# Patient Record
Sex: FEMALE | Race: WHITE | NOT HISPANIC OR LATINO | Employment: FULL TIME | URBAN - METROPOLITAN AREA
[De-identification: names, ages, dates, MRNs, and addresses within clinical notes are randomized per-mention and may not be internally consistent; named-entity substitution may affect disease eponyms.]

---

## 2017-03-09 ENCOUNTER — LAB VISIT (OUTPATIENT)
Dept: LAB | Facility: HOSPITAL | Age: 42
End: 2017-03-09
Attending: INTERNAL MEDICINE
Payer: COMMERCIAL

## 2017-03-09 ENCOUNTER — OFFICE VISIT (OUTPATIENT)
Dept: INTERNAL MEDICINE | Facility: CLINIC | Age: 42
End: 2017-03-09
Payer: COMMERCIAL

## 2017-03-09 VITALS
WEIGHT: 143.94 LBS | SYSTOLIC BLOOD PRESSURE: 128 MMHG | HEIGHT: 64 IN | HEART RATE: 81 BPM | BODY MASS INDEX: 24.57 KG/M2 | DIASTOLIC BLOOD PRESSURE: 82 MMHG

## 2017-03-09 DIAGNOSIS — S63.641S: ICD-10-CM

## 2017-03-09 DIAGNOSIS — R35.0 URINARY FREQUENCY: ICD-10-CM

## 2017-03-09 DIAGNOSIS — Z00.00 HEALTH CARE MAINTENANCE: Primary | ICD-10-CM

## 2017-03-09 DIAGNOSIS — R03.0 BORDERLINE HIGH BLOOD PRESSURE: ICD-10-CM

## 2017-03-09 DIAGNOSIS — E78.9 BORDERLINE HIGH CHOLESTEROL: ICD-10-CM

## 2017-03-09 DIAGNOSIS — Z23 NEED FOR TDAP VACCINATION: ICD-10-CM

## 2017-03-09 DIAGNOSIS — Z00.00 HEALTH CARE MAINTENANCE: ICD-10-CM

## 2017-03-09 LAB
ALBUMIN SERPL BCP-MCNC: 4.1 G/DL
ALP SERPL-CCNC: 75 U/L
ALT SERPL W/O P-5'-P-CCNC: 18 U/L
ANION GAP SERPL CALC-SCNC: 11 MMOL/L
AST SERPL-CCNC: 15 U/L
BASOPHILS # BLD AUTO: 0.03 K/UL
BASOPHILS NFR BLD: 0.4 %
BILIRUB SERPL-MCNC: 0.5 MG/DL
BILIRUB UR QL STRIP: NEGATIVE
BUN SERPL-MCNC: 11 MG/DL
CALCIUM SERPL-MCNC: 9.2 MG/DL
CHLORIDE SERPL-SCNC: 106 MMOL/L
CHOLEST/HDLC SERPL: 5 {RATIO}
CLARITY UR REFRACT.AUTO: CLEAR
CO2 SERPL-SCNC: 22 MMOL/L
COLOR UR AUTO: YELLOW
CREAT SERPL-MCNC: 0.8 MG/DL
DIFFERENTIAL METHOD: NORMAL
EOSINOPHIL # BLD AUTO: 0.2 K/UL
EOSINOPHIL NFR BLD: 3.2 %
ERYTHROCYTE [DISTWIDTH] IN BLOOD BY AUTOMATED COUNT: 12.9 %
EST. GFR  (AFRICAN AMERICAN): >60 ML/MIN/1.73 M^2
EST. GFR  (NON AFRICAN AMERICAN): >60 ML/MIN/1.73 M^2
GLUCOSE SERPL-MCNC: 118 MG/DL
GLUCOSE UR QL STRIP: NEGATIVE
HCT VFR BLD AUTO: 39.5 %
HDL/CHOLESTEROL RATIO: 20.2 %
HDLC SERPL-MCNC: 258 MG/DL
HDLC SERPL-MCNC: 52 MG/DL
HGB BLD-MCNC: 13.3 G/DL
HGB UR QL STRIP: NEGATIVE
KETONES UR QL STRIP: NEGATIVE
LDLC SERPL CALC-MCNC: 191.2 MG/DL
LEUKOCYTE ESTERASE UR QL STRIP: NEGATIVE
LYMPHOCYTES # BLD AUTO: 2.8 K/UL
LYMPHOCYTES NFR BLD: 38.4 %
MCH RBC QN AUTO: 28.9 PG
MCHC RBC AUTO-ENTMCNC: 33.7 %
MCV RBC AUTO: 86 FL
MONOCYTES # BLD AUTO: 0.5 K/UL
MONOCYTES NFR BLD: 6.5 %
NEUTROPHILS # BLD AUTO: 3.7 K/UL
NEUTROPHILS NFR BLD: 51.4 %
NITRITE UR QL STRIP: NEGATIVE
NONHDLC SERPL-MCNC: 206 MG/DL
PH UR STRIP: 6 [PH] (ref 5–8)
PLATELET # BLD AUTO: 316 K/UL
PMV BLD AUTO: 9.3 FL
POTASSIUM SERPL-SCNC: 4.4 MMOL/L
PROT SERPL-MCNC: 7.5 G/DL
PROT UR QL STRIP: NEGATIVE
RBC # BLD AUTO: 4.61 M/UL
SODIUM SERPL-SCNC: 139 MMOL/L
SP GR UR STRIP: 1.02 (ref 1–1.03)
TRIGL SERPL-MCNC: 74 MG/DL
TSH SERPL DL<=0.005 MIU/L-ACNC: 1.75 UIU/ML
URN SPEC COLLECT METH UR: NORMAL
UROBILINOGEN UR STRIP-ACNC: NEGATIVE EU/DL
WBC # BLD AUTO: 7.26 K/UL

## 2017-03-09 PROCEDURE — 85025 COMPLETE CBC W/AUTO DIFF WBC: CPT

## 2017-03-09 PROCEDURE — 99396 PREV VISIT EST AGE 40-64: CPT | Mod: 25,S$GLB,, | Performed by: INTERNAL MEDICINE

## 2017-03-09 PROCEDURE — 84443 ASSAY THYROID STIM HORMONE: CPT

## 2017-03-09 PROCEDURE — 36415 COLL VENOUS BLD VENIPUNCTURE: CPT

## 2017-03-09 PROCEDURE — 99999 PR PBB SHADOW E&M-EST. PATIENT-LVL IV: CPT | Mod: PBBFAC,,, | Performed by: INTERNAL MEDICINE

## 2017-03-09 PROCEDURE — 87088 URINE BACTERIA CULTURE: CPT

## 2017-03-09 PROCEDURE — 87086 URINE CULTURE/COLONY COUNT: CPT

## 2017-03-09 PROCEDURE — 80053 COMPREHEN METABOLIC PANEL: CPT

## 2017-03-09 PROCEDURE — 90715 TDAP VACCINE 7 YRS/> IM: CPT | Mod: S$GLB,,, | Performed by: INTERNAL MEDICINE

## 2017-03-09 PROCEDURE — 90471 IMMUNIZATION ADMIN: CPT | Mod: S$GLB,,, | Performed by: INTERNAL MEDICINE

## 2017-03-09 PROCEDURE — 80061 LIPID PANEL: CPT

## 2017-03-09 PROCEDURE — 81003 URINALYSIS AUTO W/O SCOPE: CPT

## 2017-03-09 PROCEDURE — 83036 HEMOGLOBIN GLYCOSYLATED A1C: CPT

## 2017-03-09 NOTE — PROGRESS NOTES
"Subjective:       Patient ID: Stacia Clark is a 41 y.o. female.    Chief Complaint: Annual Exam    HPI   42 yo F here for annual wellness exam.    She has hx of back and neck issues w pinched nerves and djd. She had surgery at age 19yo.  She had TLH for pelvic pain in 2016. Path benign.     Urinating more often since surgery, feels like she has incomplete emptying.     Still with umbilical hernia. Working on losing weight.     She went to urgent care a few years ago regarding "skier's thumb."  Wraps boxes a lot at work and repeated use aggravates this. Ibuprofen few times a week.   Wears brace at night and during day when bad.   Review of Systems   Constitutional: Negative for fever.   HENT: Negative.    Eyes: Negative.    Respiratory: Negative for shortness of breath.    Cardiovascular: Negative for chest pain and leg swelling.   Gastrointestinal: Negative for abdominal pain, diarrhea, nausea and vomiting.   Genitourinary: Positive for frequency.        Feels like she does not completely empty her bladder   Musculoskeletal: Negative for arthralgias.   Skin: Negative for rash.   Psychiatric/Behavioral: Negative.        Objective:   /82  Pulse 81  Ht 5' 4" (1.626 m)  Wt 65.3 kg (143 lb 15.4 oz)  LMP 12/27/2015  BMI 24.71 kg/m2     Physical Exam   Constitutional: She is oriented to person, place, and time. She appears well-developed and well-nourished.   HENT:   Head: Normocephalic and atraumatic.   Eyes: Conjunctivae and EOM are normal. Pupils are equal, round, and reactive to light.   Neck: Neck supple. No thyromegaly present.   Cardiovascular: Normal rate, regular rhythm and normal heart sounds.    No murmur heard.  Pulmonary/Chest: Effort normal and breath sounds normal. No respiratory distress. She has no wheezes.   Abdominal: Soft. Bowel sounds are normal. She exhibits no distension. There is no tenderness.   Musculoskeletal: Normal range of motion.   Neurological: She is alert and oriented to " person, place, and time.   Skin: Skin is warm and dry. No rash noted.   Psychiatric: She has a normal mood and affect. Judgment and thought content normal.   Vitals reviewed.      Assessment:       1. Health care maintenance    2. Skier's thumb, right, sequela    3. Need for Tdap vaccination    4. Borderline high blood pressure    5. Borderline high cholesterol    6. Urinary frequency        Plan:       Stacia was seen today for annual exam.    Diagnoses and all orders for this visit:    Health care maintenance  -     CBC auto differential; Future  -     Comprehensive metabolic panel; Future  -     Hemoglobin A1c; Future  -     Lipid panel; Future  -     TSH; Future    Skier's thumb, right, sequela  -     Ambulatory referral to Orthopedics   Continue brace   nsaids prn    Need for Tdap vaccination  -     Tdap Vaccine    Borderline high blood pressure   Discussed low salt diet    Borderline high cholesterol   Discussed low cholesterol diet - already following pretty low cholesterol diet    Urinary frequency and sensation of incomplete bladder emptying since hysterectomy  -     Urinalysis  -     Urine culture   If symptoms persist advised her to discuss w Dr. Tovar; consider urology evaluation

## 2017-03-09 NOTE — MR AVS SNAPSHOT
"    [facility]  [address]  [facility] [address]  Phone: [phone]  Fax: [phone]                  Stacia Clark   3/9/2017 8:00 AM   Office Visit    Description:  Female : 1975   Provider:  Bianca Gibson MD   Department:  [facility]           Reason for Visit     Annual Exam           Diagnoses this Visit        Comments    Health care maintenance    -  Primary     Skier's thumb, right, sequela         Need for Tdap vaccination         Borderline high blood pressure         Borderline high cholesterol         Urinary frequency                To Do List           Goals (5 Years of Data)     None      Follow-Up and Disposition     Return in about 1 year (around 3/9/2018) for 40 minute established physical.      [facility] On Call     [facility] On Call Nurse Care Line -  Assistance  Registered nurses in the [facility] On Call Center provide clinical advisement, health education, appointment booking, and other advisory services.  Call for this free service at [phone].             Medications           Message regarding Medications     Verify the changes and/or additions to your medication regime listed below are the same as discussed with your clinician today.  If any of these changes or additions are incorrect, please notify your healthcare provider.             Verify that the below list of medications is an accurate representation of the medications you are currently taking.  If none reported, the list may be blank. If incorrect, please contact your healthcare provider. Carry this list with you in case of emergency.                Clinical Reference Information           Your Vitals Were     BP Pulse Height Weight Last Period BMI    128/82 81 5' 4" (1.626 m) 65.3 kg (143 lb 15.4 oz) 2015 24.71 kg/m2      Blood Pressure          Most Recent Value    BP  128/82      Allergies as of 3/9/2017     No Known Allergies      Immunizations " Administered on Date of Encounter - 3/9/2017     Name Date Dose VIS Date Route    TDAP  Incomplete 0.5 mL 2/24/2015 Intramuscular      Orders Placed During Today's Visit      Normal Orders This Visit    Ambulatory referral to Orthopedics     Tdap Vaccine     Urinalysis     Urine culture     Future Labs/Procedures Expected by Expires    CBC auto differential  3/9/2017 5/8/2018    Comprehensive metabolic panel  3/9/2017 5/8/2018    Hemoglobin A1c  3/9/2017 5/8/2018    Lipid panel  3/9/2017 5/8/2018    TSH  3/9/2017 5/8/2018      Instructions      Controlling High Blood Pressure  High blood pressure (hypertension) is often called the silent killer. This is because many people who have it dont know it. High blood pressure is defined as 140/90 mm Hg or higher. Know your blood pressure and remember to check it regularly. Doing so can save your life. Here are some things you can do to help control your blood pressure.    Choose heart-healthy foods  · Select low-salt, low-fat foods. Limit sodium intake to 2,400 mg per day or the amount suggested by your healthcare provider.  · Limit canned, dried, cured, packaged, and fast foods. These can contain a lot of salt.  · Eat 8 to 10 servings of fruits and vegetables every day.  · Choose lean meats, fish, or chicken.  · Eat whole-grain pasta, brown rice, and beans.  · Eat 2 to 3 servings of low-fat or fat-free dairy products.  · Ask your doctor about the DASH eating plan. This plan helps reduce blood pressure.  · When you go to a restaurant, ask that your meal be prepared with no added salt.  Maintain a healthy weight  · Ask your healthcare provider how many calories to eat a day. Then stick to that number.  · Ask your healthcare provider what weight range is healthiest for you. If you are overweight, a weight loss of only 3% to 5% of your body weight can help lower blood pressure. Generally, a good weight loss goal is to lose 10% of your body weight in a year.  · Limit snacks  and sweets.  · Get regular exercise.  Get up and get active  · Choose activities you enjoy. Find ones you can do with friends or family. This includes bicycling, dancing, walking, and jogging.  · Park farther away from building entrances.  · Use stairs instead of the elevator.  · When you can, walk or bike instead of driving.  · [place] leaves, garden, or do household repairs.  · Be active at a moderate to vigorous level of physical activity for at least 40 minutes for a minimum of 3 to 4 days a week.   Manage stress  · Make time to relax and enjoy life. Find time to laugh.  · Communicate your concerns with your loved ones and your healthcare provider.  · Visit with family and friends, and keep up with hobbies.  Limit alcohol and quit smoking  · Men should have no more than 2 drinks per day.  · Women should have no more than 1 drink per day.  · Talk with your healthcare provider about quitting smoking. Smoking significantly increases your risk for heart disease and stroke. Ask your healthcare provider about community smoking cessation programs and other options.  Medicines  If lifestyle changes arent enough, your healthcare provider may prescribe high blood pressure medicine. Take all medicines as prescribed. If you have any questions about your medicines, ask your healthcare provider before stopping or changing them.   Date Last Reviewed: 4/27/2016 © 2000-2016 [employer]. [address] [place] [address]. All rights reserved. This information is not intended as a substitute for professional medical care. Always follow your healthcare professional's instructions.             Language Assistance Services     ATTENTION: Language assistance services are available, free of charge. Please call [phone].      ATENCIÓN: Si habla español, tiene a gan disposición servicios gratuitos de asistencia lingüística. Llame al [phone].     SHYLA Ý: N?u b?n nói Ti?ng Vi?t, có các d?ch v? h? tr? ngôn  ng? mi?n phí dành cho b?n. G?i s? [phone].         [facility] complies with applicable Federal civil rights laws and does not discriminate on the basis of race, color, national origin, age, disability, or sex.

## 2017-03-09 NOTE — PATIENT INSTRUCTIONS
Controlling High Blood Pressure  High blood pressure (hypertension) is often called the silent killer. This is because many people who have it dont know it. High blood pressure is defined as 140/90 mm Hg or higher. Know your blood pressure and remember to check it regularly. Doing so can save your life. Here are some things you can do to help control your blood pressure.    Choose heart-healthy foods  · Select low-salt, low-fat foods. Limit sodium intake to 2,400 mg per day or the amount suggested by your healthcare provider.  · Limit canned, dried, cured, packaged, and fast foods. These can contain a lot of salt.  · Eat 8 to 10 servings of fruits and vegetables every day.  · Choose lean meats, fish, or chicken.  · Eat whole-grain pasta, brown rice, and beans.  · Eat 2 to 3 servings of low-fat or fat-free dairy products.  · Ask your doctor about the DASH eating plan. This plan helps reduce blood pressure.  · When you go to a restaurant, ask that your meal be prepared with no added salt.  Maintain a healthy weight  · Ask your healthcare provider how many calories to eat a day. Then stick to that number.  · Ask your healthcare provider what weight range is healthiest for you. If you are overweight, a weight loss of only 3% to 5% of your body weight can help lower blood pressure. Generally, a good weight loss goal is to lose 10% of your body weight in a year.  · Limit snacks and sweets.  · Get regular exercise.  Get up and get active  · Choose activities you enjoy. Find ones you can do with friends or family. This includes bicycling, dancing, walking, and jogging.  · Park farther away from building entrances.  · Use stairs instead of the elevator.  · When you can, walk or bike instead of driving.  · [place] leaves, garden, or do household repairs.  · Be active at a moderate to vigorous level of physical activity for at least 40 minutes for a minimum of 3 to 4 days a week.   Manage stress  · Make time to relax and enjoy  life. Find time to laugh.  · Communicate your concerns with your loved ones and your healthcare provider.  · Visit with family and friends, and keep up with hobbies.  Limit alcohol and quit smoking  · Men should have no more than 2 drinks per day.  · Women should have no more than 1 drink per day.  · Talk with your healthcare provider about quitting smoking. Smoking significantly increases your risk for heart disease and stroke. Ask your healthcare provider about community smoking cessation programs and other options.  Medicines  If lifestyle changes arent enough, your healthcare provider may prescribe high blood pressure medicine. Take all medicines as prescribed. If you have any questions about your medicines, ask your healthcare provider before stopping or changing them.   Date Last Reviewed: 4/27/2016  © 9022-4296 [employer]. [address] [place] [address], [place] [address]. All rights reserved. This information is not intended as a substitute for professional medical care. Always follow your healthcare professional's instructions.

## 2017-03-10 LAB
ESTIMATED AVG GLUCOSE: 131 MG/DL
HBA1C MFR BLD HPLC: 6.2 %

## 2017-03-11 LAB — BACTERIA UR CULT: NORMAL

## 2017-03-17 ENCOUNTER — PATIENT MESSAGE (OUTPATIENT)
Dept: INTERNAL MEDICINE | Facility: CLINIC | Age: 42
End: 2017-03-17

## 2017-03-20 ENCOUNTER — TELEPHONE (OUTPATIENT)
Dept: ORTHOPEDICS | Facility: CLINIC | Age: 42
End: 2017-03-20

## 2017-03-20 DIAGNOSIS — R52 PAIN: Primary | ICD-10-CM

## 2017-03-20 NOTE — TELEPHONE ENCOUNTER
Stacia Clark reminded of appointment on 3/21/17 with Dr. DIANNA Kim w/time and location. Notified of need for xray before OV w/date, time, and location of appts.

## 2017-03-21 ENCOUNTER — OFFICE VISIT (OUTPATIENT)
Dept: ORTHOPEDICS | Facility: CLINIC | Age: 42
End: 2017-03-21
Payer: COMMERCIAL

## 2017-03-21 ENCOUNTER — HOSPITAL ENCOUNTER (OUTPATIENT)
Dept: RADIOLOGY | Facility: OTHER | Age: 42
Discharge: HOME | End: 2017-03-21
Attending: ORTHOPAEDIC SURGERY
Payer: COMMERCIAL

## 2017-03-21 VITALS
HEIGHT: 64 IN | SYSTOLIC BLOOD PRESSURE: 152 MMHG | DIASTOLIC BLOOD PRESSURE: 92 MMHG | HEART RATE: 87 BPM | WEIGHT: 143.94 LBS | BODY MASS INDEX: 24.57 KG/M2

## 2017-03-21 DIAGNOSIS — R52 PAIN: ICD-10-CM

## 2017-03-21 DIAGNOSIS — M79.644 THUMB PAIN, RIGHT: Primary | ICD-10-CM

## 2017-03-21 PROCEDURE — 1160F RVW MEDS BY RX/DR IN RCRD: CPT | Mod: S$GLB,,, | Performed by: ORTHOPAEDIC SURGERY

## 2017-03-21 PROCEDURE — 73130 X-RAY EXAM OF HAND: CPT | Mod: 26,RT,, | Performed by: RADIOLOGY

## 2017-03-21 PROCEDURE — 73130 X-RAY EXAM OF HAND: CPT | Mod: TC,RT

## 2017-03-21 PROCEDURE — 99203 OFFICE O/P NEW LOW 30 MIN: CPT | Mod: S$GLB,,, | Performed by: ORTHOPAEDIC SURGERY

## 2017-03-21 PROCEDURE — 99999 PR PBB SHADOW E&M-EST. PATIENT-LVL III: CPT | Mod: PBBFAC,,, | Performed by: ORTHOPAEDIC SURGERY

## 2017-03-21 NOTE — LETTER
March 21, 2017      Bianca Gibson MD  [address]  [facility] [address]           [facility]  [address]  [address]  [facility] [address]  Phone: [phone]          Patient: Stacia Clark   MR Number: [id number]   YOB: 1975   Date of Visit: 3/21/2017       Dear Dr. Bianca Gibson:    Thank you for referring Stacia Clark to me for evaluation. Attached you will find relevant portions of my assessment and plan of care.    If you have questions, please do not hesitate to call me. I look forward to following Stacia Clark along with you.    Sincerely,    Tamika Mena PA-C    Enclosure  CC:  No Recipients    If you would like to receive this communication electronically, please contact [email or web address] or [phone] to request more information on [employer] Link access.    For providers and/or their staff who would like to refer a patient to [facility], please contact us through our one-stop-shop provider referral line, [facility] [place], at [phone].    If you feel you have received this communication in error or would no longer like to receive these types of communications, please e-mail [email or web address]

## 2017-03-21 NOTE — PROGRESS NOTES
This office note has been dictated.   Dictation Confirmation Code: [id number]  Radha Mena PA-C  03/21/2017  9:13 AM  Supervising Physician:  MD Stacia Billy was seen today for pain and numbness.    Diagnoses and all orders for this visit:    Thumb pain, right  -     Ambulatory Referral to Physical/Occupational Therapy

## 2017-03-21 NOTE — MR AVS SNAPSHOT
"    [facility]  [address]  [address]  [facility] [address]  Phone: [phone]                  Stacia Clark   3/21/2017 9:30 AM   Office Visit    Description:  Female : 1975   Provider:  Sienna Kim MD   Department:  [facility]           Reason for Visit     Right Hand - Pain, Numbness           Diagnoses this Visit        Comments    Thumb pain, right    -  Primary            To Do List           Future Appointments        Provider Department Dept Phone    2017 9:00 AM Sienna Kim MD [facility] [phone]      Goals (5 Years of Data)     None      [facility] On [facility]     [facility] On [facility] Nurse Care Line -  Assistance  Registered nurses in the [facility] On Call Center provide clinical advisement, health education, appointment booking, and other advisory services.  Call for this free service at [phone].             Medications           Message regarding Medications     Verify the changes and/or additions to your medication regime listed below are the same as discussed with your clinician today.  If any of these changes or additions are incorrect, please notify your healthcare provider.             Verify that the below list of medications is an accurate representation of the medications you are currently taking.  If none reported, the list may be blank. If incorrect, please contact your healthcare provider. Carry this list with you in case of emergency.                Clinical Reference Information           Your Vitals Were     BP Pulse Height Weight Last Period BMI    152/92 (BP Location: Left arm) 87 5' 4" (1.626 m) 65.3 kg (143 lb 15.4 oz) 2015 24.71 kg/m2      Blood Pressure          Most Recent Value    BP  (!)  152/92      Allergies as of 3/21/2017     No Known Allergies      Immunizations Administered on Date of Encounter - 3/21/2017     None      Orders Placed During Today's Visit      Normal Orders This Visit "    Ambulatory Referral to Physical/Occupational Therapy       Language Assistance Services     ATTENTION: Language assistance services are available, free of charge. Please call [phone].      ATENCIÓN: Si habla darwin, tiene a gan disposición servicios gratuitos de asistencia lingüística. Llame al [phone].     CHÚ Ý: N?u b?n nói Ti?ng Vi?t, có các d?ch v? h? tr? ngôn ng? mi?n phí dành cho b?n. G?i s? [phone].         [facility] complies with applicable Federal civil rights laws and does not discriminate on the basis of race, color, national origin, age, disability, or sex.

## 2017-03-21 NOTE — PROGRESS NOTES
I have personally taken the history and examined the patient. I agree with the Hand Surgery PA's note. The plan will be OT. Pt had repetitive injury ( wrapping favor boxes). Pt did go to urgent care. 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  Pt has used brace.

## 2017-03-22 NOTE — PROGRESS NOTES
CHIEF COMPLAINT:  Right thumb pain.    HISTORY OF PRESENT ILLNESS:  Ms. Clark is a 41-year-old right-hand dominant   female presenting today for evaluation of her right thumb that has been painful   for approximately two years.  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  She reports this has been   going on for two years and is still not getting better.  It occasionally locks   and is painful.  She feels like she has got to move it back into position.  She   occasionally has numbness.  She feels that it is not going away.  It comes and   goes.  She is wearing a thumb brace, although she feels that it is not very   stable.  She also has pain that radiates from the thumb all the way up the   forearm and as well as on the dorsal aspect of the thumb.  No specific locking   or triggering of the thumb.    History reviewed. No pertinent past medical history.    Past Surgical History:   Procedure Laterality Date    BACK SURGERY      discectomy for herniated disc; age 19yo    INDUCED   ~    LAPAROSCOPIC HYSTERECTOMY  2016    WISDOM TOOTH EXTRACTION         Social History     Social History    Marital status: Single     Spouse name: N/A    Number of children: N/A    Years of education: N/A     Occupational History    Not on file.     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     No current facility-administered medications on file prior to visit.        Review of patient's allergies indicates:  No Known  "Allergies    Review of Systems:  Constitutional: no fever or chills  ENT: no nasal congestion or sore throat  Respiratory: no cough or shortness of breath  Cardiovascular: no chest pain or palpitations  Gastrointestinal: no nausea or vomiting  Genitourinary: no hematuria or dysuria  Integument/Breast: no rash or pruritis  Hematologic/Lymphatic: no easy bruising or lymphadenopathy  Musculoskeletal: see HPI  Neurological: no seizures or tremors  Behavioral/Psych: no auditory or visual hallucinations      PHYSICAL EXAM    Vitals:    03/21/17 0858   BP: (!) 152/92   Pulse: 87   Weight: 65.3 kg (143 lb 15.4 oz)   Height: 5' 4" (1.626 m)   PainSc:   5   PainLoc: Hand       GENERAL:  Well developed, well nourished, no acute distress.  CARDIOVASCULAR:  Regular rate and rhythm.  LUNGS:  Respirations equal and unlabored.  BEHAVIORAL AND PSYCH:  Mood and affect appropriate.  NEUROLOGIC:  No tremor.  HEENT:  Normocephalic, atraumatic.  MUSCULOSKELETAL:  Upper extremity exam:  She is distally neurovascularly intact.    AIN, PIN nerves are intact.  Sensation over the radial, ulnar and median   nerves are equivocal.  No tenderness over the ulnar collateral or radial   collateral ligament.  No laxity appreciated of either of the collaterals.  She   does not have any disruption in the central slip.  She has minimal tenderness at   the level of the A1 pulley.  No catch and snap appreciated.  Mild tenderness in   the thenar, mild tenderness along the carpal tunnel, but negative Tinel's.    X-RAY IMAGING:  No fractures, dislocations, no subluxation of the MCP.    ASSESSMENT:  Overuse injury of the right thumb.    PLAN:  I discussed with Ms. Clark that at this time, I do not appreciate any   signs or symptoms of de Quervain's or trigger thumb.  She appears to have an   overuse injury of the thumb.  We will try therapy for range of motion   modalities, ultrasounding as needed.  We also discussed that they will make her   an orthoplast " or a custom orthotic to help with stabilization of the thumb that   she can wear more frequently.  She will proceed with this and followup in clinic   in seven weeks per Dr. Kerr.    SUPERVISING PHYSICIAN:  Sienna Kim M.D.    DICTATED BY:  Tamika CHRISTINE  dd: 03/21/2017 10:47:55 (CDT)  td: 03/22/2017 02:27:05 (CDT)  Doc ID   #[id number]  Job ID #[id number]    CC:

## 2017-03-27 ENCOUNTER — CLINICAL SUPPORT (OUTPATIENT)
Dept: REHABILITATION | Facility: HOSPITAL | Age: 42
End: 2017-03-27
Attending: ORTHOPAEDIC SURGERY
Payer: OTHER MISCELLANEOUS

## 2017-03-27 DIAGNOSIS — M25.541 PAIN IN THUMB JOINT WITH MOVEMENT OF RIGHT HAND: ICD-10-CM

## 2017-03-27 PROCEDURE — L3913 HFO W/O JOINTS CF: HCPCS | Mod: PO

## 2017-03-27 PROCEDURE — 97165 OT EVAL LOW COMPLEX 30 MIN: CPT | Mod: PO

## 2017-03-27 PROCEDURE — 97018 PARAFFIN BATH THERAPY: CPT | Mod: PO

## 2017-03-27 NOTE — PATIENT INSTRUCTIONS
IP Flexion (Active Blocked)        Brace thumb below tip joint. Bend joint as far as possible.  Repeat ____ times. Do ____ sessions per day.      Easy Thumb Bend        Keeping fingers relaxed, bend thumb joints then straighten.  Repeat ____ times. Do ____ sessions per day.      IP Extension (Active Blocked)        Brace thumb below tip joint. Extend joint as far as possible.  Repeat ____ times. Do ____ sessions per day.      AROM: Wrist Extension        With right palm down, bend wrist up.  Repeat ____ times per set. Do ____ sets per session. Do ____ sessions per day.       AROM: Wrist Flexion        With right palm up, bend wrist up.  Repeat ____ times per set. Do ____ sets per session. Do ____ sessions per day.       Strengthening (Resistive Putty)        Squeeze putty using thumb and all fingers.  Repeat ____ times. Do ____ sessions per day.

## 2017-03-27 NOTE — PROGRESS NOTES
"Occupational Therapy -Hand / Wrist  Evaluation    Patient: Stacia Clark  Date of Evaluation: 3/27/2017  Referring Physician: Sienna Kim, *  Diagnosis:   1. Pain in thumb joint with movement of right hand       MRN: [id number]  Age: 41 y.o.  Sex: female     Referral Orders:   Eval and treat     Start Time: 3pm  End Time: 4pm  Total Time: 60      Hand dominance: Right      Subjective     Stacia is a 41 y.o. female that presents to clinic secondary to R thumb pain. 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 She went to urgent care and was given a brace to wear.  X-ray and MRI was taken and revealed no fractures nor dislocations. Previous treatment included immobilization and rest. Pt reports that gripping and pinches increases her pain and  reports her pain at worst is a 6 on the VAS. She reports that she has a "locking" feeling throughout the day after overuse that she has to pull out and then away in order to regain use of her thumb again. Pt uses Ibuprofen to control her symptoms. No cultural, environmental, or spiritual barriers identified to treatment or learning.    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    Allergies:  Review of patient's allergies indicates:  No Known Allergies    Barriers:  Environmental Concerns/ Fall Risk:  None  Barriers to Learning: None   Cultural/Spiritual : None   Developmental/Education: None   Abuse/ Neglect: none   Nutritional Deficit: None   Language: None   Hearing/Vision Deficit: None "   Other: NONE    Objective     Observation:   WNL    Sensation:   Minimal paresthesia noted in distal area of R thumb    Special Tests:   Finkelstein's Test  negative  Grind Test  negative   Martínez Scaphoid Shift Test  negative    Range of Motion:     AROM  (Ext/Flex) Thumb Right  Thumb Left   MP 0/50 0/55   PIP 0/60 0/60   AMRLOW 130 135     Right:  Thumb Opposition: 0  Palmar Abduction: 60  Radial Abduction: 55    Manual Muscle Testing: Right    Median Innervated:  FPL 4+/5   APB 4/5   OP 4/5     Ulnar Innervated:  WNL    Radial Nerve Innervated:  EPL 4/5        Strength: (RODNEY Dynamometer in lbs.) Average 3 trials, Position II  Right: 37#  Left: 44#    Pinch Strength: (Pinch Gauge in psi's), Average 3 trials  Cornejo Pinch  R) 7 psi's    L) 10 psi's  3pt Pinch    R) 8 psi's   L) 9 psi's  2pt Pinch    R) 5 psi's    L) 6 psi's       OUTCOME MEASURE: FOTO    Category: Self Care      Current Score  = 51% or 49% impaired  Goal at Discharge Score = 65% or 35% impaired      Treatment today included: Patient received paraffin bath to R hand(s) for 10 minutes to increase blood flow, circulation, pain management and for tissue elasticity prior to participation and demonstration of HEP. Fabricated a hand based thumb spica orthosis to R hand to protect tendon, maintain immobilization of R thumb in order\  to improve functional hand use.  Instructed to wear while working, with removal for HEP, bathing/hygiene.  Instructed to monitor for pain/pressure, increased edema, or redness and to contact office for adjustments as needed. Patient reported good understanding of orthotic wear and care schedule.     Assessment     This is a 41 y.o. female referred to outpatient occupational/hand therapy and presents with a medical diagnosis of R thumb sprain and demonstrates limitations as described in the problem list. Following brief medical record review it is determined that pt will benefit from occupational therapy services in order to  maximize pain free and/or functional use of right hand. The following goals were discussed with the patient and patient is in agreement with them as to be addressed in the treatment plan. Pt was given a HEP consisting of thumb IP blocking, median nerve glides, and putty squeeze. Pt verbally understood the instructions as they were given and demonstrated proper form and technique during therapy. Pt was advise to perform these exercises free of pain, and to stop performing them if pain occurs. The patient's rehab potential is good.     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    Medical and Therapy History Review: Brief                 Performance Deficits: As follows affecting work tasks (assembling boxes, packing candy, making candy, wrapping boxes)    Physical:  Decreased ROM   Decreased  and pinch strength   Decreased muscle strength   Decreased functional hand use   Increased pain       Cognitive:  WNL    Psychosocial:    WNL     Modification of tasks during evaluation: Clinical decision making of low complexity, which includes an analysis of the occupational profile, analysis of date from problem focused assessments, and the consideration of limited treatment options. Patient presents with few comorbidities that affect occupational performance. No modification(s) of tasks or assistance with assessments is necessary to enable completion of evaluation component.   low  Based on PMHX, co morbidities , data from assessments and functional level of assistance required with task and clinical presentation directly impacting           Goals: ( 6 weeks)   1)   Patient to be IND with HEP and modalities for pain management  2)   Increase  strength 45 lbs. to grasp packaging at work.  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  4)   Patient to score at 65% or more on  FOTO to demonstrate improved perception of functional RUE use.        Plan     Pt to be treated by Occupational Therapy 1-2 times per week for 5 weeks during the certification period from 3/27/17 to 5/2/17 to achieve the established goals.     Treatment to include: Paraffin, Fluidotherapy, Manual therapy/joint mobilizations, Modalities for pain management, US 3 mhz, Therapeutic exercises/activities., Iontophoresis with 2.0 cc Dexamethasone, Strengthening, Orthotic Fabrication/Fit/Training, Joint Protection and Energy Conservation, as well as any other treatments deemed necessary based on the patient's needs or progress.                   I certify the need for these services furnished under this plan of treatment and while under my care    ____________________________________                         __________________  Physician/Referring Practitioner                                               Date of Signature

## 2017-04-04 ENCOUNTER — TELEPHONE (OUTPATIENT)
Dept: ORTHOPEDICS | Facility: CLINIC | Age: 42
End: 2017-04-04

## 2017-04-04 NOTE — TELEPHONE ENCOUNTER
----- Message from Annelise Coronado sent at 4/4/2017  4:06 PM CDT -----  Contact: pt  _  1st Request  _  2nd Request  _  3rd Request        Who: pt    Why: pt needs a doctor's note from the nurse. Please call the pt    What Number to Call Back:[phone]    When to Expect a call back: (Before the end of the day)   -- if the call is after 12:00, the call back will be tomorrow.

## 2017-04-05 ENCOUNTER — CLINICAL SUPPORT (OUTPATIENT)
Dept: REHABILITATION | Facility: HOSPITAL | Age: 42
End: 2017-04-05
Attending: ORTHOPAEDIC SURGERY
Payer: OTHER MISCELLANEOUS

## 2017-04-05 DIAGNOSIS — M25.541 PAIN IN THUMB JOINT WITH MOVEMENT OF RIGHT HAND: ICD-10-CM

## 2017-04-05 PROCEDURE — 97022 WHIRLPOOL THERAPY: CPT | Mod: PO

## 2017-04-05 PROCEDURE — 97140 MANUAL THERAPY 1/> REGIONS: CPT | Mod: PO

## 2017-04-05 PROCEDURE — 97110 THERAPEUTIC EXERCISES: CPT | Mod: PO

## 2017-04-05 NOTE — PROGRESS NOTES
"OT Daily Progress Note    Patient:  Stacia Clark  [facility] #:  [id number]   Date of Note: 04/05/2017   Referring Physician:  Sienna Kim, *  Diagnosis:  Thumb Pain   Encounter Diagnosis   Name Primary?    Pain in thumb joint with movement of right hand         Start Time: 3pm  End Time: 4pm  Total Time: 60 min  Group Time: -    Visit 2 of 20 authorized      Subjective:   "Its just really been hurting so badly. It hurts on the top of my hand, it hurts into the elbow. I got a note so that I can be off of work for the next week, I really hope it helps to rest it"  Pain: 7 out of 10     Objective:   Patient seen by OT this session. Treatment  consist of the following:  Paraffin, Ultrasound, manual therapy/joint mobilization, Therapeutic exercises, Edema management and Joint protection    Patient received paraffin bath to R hand(s) for 10 minutes to increase blood flow, circulation, pain management and for tissue elasticity prior to therex. Patient received ultrasound to  R thumb and dorsal hand area to increase blood flow, circulation, tissue elasticity, pain management and for wound/scar management for 8 minutes @ 3.3 Mhz, Intensity 0.8 w/cm2 at 50* duty cycle. Pt was instructed on and participated in the following therapeutic exercises while in fluido therapy for improved ROM with pain modulation: open hand wrist flex/ext, rd/ud, tendon glides and gentle sponge squeeze x 3 min each direction. Per pt report of some numbness and tingling in median nerve distribution of hand, Phalen's test performed again today with positive result. Performed RM to R digits/hand/wrist x 8 min to decrease edema, improve joint mobility, decrease pain and improve lymphatic drainage to increase hand function. Stacia  received a 18 hour extended wear Iontophoresis patch for pain control and decreased inflammation with 1.1cc Dexamethasone applied to R thumb area . Patient educated on wear time and precautions voicing  understanding " and compliance.    Treatment: Paraffin x 10 min, Ultrasound x 8 min, Therapeutic exercises x 20 min and Manual therapy x 10 min     Assessment:  Pt will continue to benefit from skilled OT intervention.  Pt arrived with high levels of pain today however she reports that she has taken leave from work in order to modulate pain and avoid repetitive over use. Responded well to all modalities without adverse effects however only minimal decrease in pain reported. Pt has most difficulty with end range wrist extension. Also presents with positive Phalen's test today indicative of symptoms of CTS. Educated on etiology and avoiding end range motions to prevent nerve compression at wrist. Will continue to progress as pt is able to tolerate.  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  4) Patient to score at 65% or more on FOTO to demonstrate improved perception of functional RUE use.      Plan:  Continue 2x week x 6 weeks  during the certification period from   3/27/17 to 5/2/17  in pursuit of  OT goals.

## 2017-04-07 ENCOUNTER — CLINICAL SUPPORT (OUTPATIENT)
Dept: REHABILITATION | Facility: HOSPITAL | Age: 42
End: 2017-04-07
Attending: ORTHOPAEDIC SURGERY
Payer: OTHER MISCELLANEOUS

## 2017-04-07 DIAGNOSIS — M25.541 PAIN IN THUMB JOINT WITH MOVEMENT OF RIGHT HAND: ICD-10-CM

## 2017-04-07 PROCEDURE — 97110 THERAPEUTIC EXERCISES: CPT | Mod: PO

## 2017-04-07 PROCEDURE — 97018 PARAFFIN BATH THERAPY: CPT | Mod: PO

## 2017-04-07 PROCEDURE — 97140 MANUAL THERAPY 1/> REGIONS: CPT | Mod: PO

## 2017-04-07 PROCEDURE — 97035 APP MDLTY 1+ULTRASOUND EA 15: CPT | Mod: PO

## 2017-04-07 NOTE — PROGRESS NOTES
"OT Daily Progress Note    Patient:  Stacia Clark  [facility] #:  [id number]   Date of Note: 04/07/2017   Referring Physician:  Sienna Kim, *  Diagnosis:  Thumb Pain   Encounter Diagnosis   Name Primary?    Pain in thumb joint with movement of right hand         Start Time: 3pm  End Time: 4pm  Total Time: 60 min  Group Time: -    Visit 2 of 20 authorized      Subjective:   "Its actually feeling a little better, probably from taking a break"  Pain: 7 out of 10     Objective:   Patient seen by OT this session. Treatment  consist of the following:  Paraffin, Ultrasound, manual therapy/joint mobilization, Therapeutic exercises, Edema management and Joint protection    Patient received paraffin bath to R hand(s) for 10 minutes to increase blood flow, circulation, pain management and for tissue elasticity prior to therex. Patient received ultrasound to  R thumb and dorsal hand area to increase blood flow, circulation, tissue elasticity, pain management and for wound/scar management for 8 minutes @ 3.3 Mhz, Intensity 0.8 w/cm2 at 50* duty cycle. Pt was instructed on and participated in the following therapeutic exercises while in fluido therapy for improved ROM with pain modulation: open hand wrist flex/ext, rd/ud, tendon glides, opposition, thumb abduction (shultz) and gentle sponge squeeze x 3 min each direction. Lateral epicondyle stretch    Treatment: Paraffin x 10 min, Ultrasound x 8 min, Therapeutic exercises x 20 min and Manual therapy x 10 min     Assessment:  Pt will continue to benefit from skilled OT intervention.  Pt arrived with high levels of pain today however she reports that she has taken leave from work in order to modulate pain and avoid repetitive over use. Responded well to all modalities without adverse effects however only minimal decrease in pain reported. Pt has most difficulty with end range wrist extension. Arrived with less pain today, will continue to progress. 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  4) Patient to score at 65% or more on FOTO to demonstrate improved perception of functional RUE use.      Plan:  Continue 2x week x 6 weeks  during the certification period from   3/27/17 to 5/2/17  in pursuit of  OT goals.

## 2017-04-10 ENCOUNTER — TELEPHONE (OUTPATIENT)
Dept: ORTHOPEDICS | Facility: CLINIC | Age: 42
End: 2017-04-10

## 2017-04-10 DIAGNOSIS — M79.644 THUMB PAIN, RIGHT: Primary | ICD-10-CM

## 2017-04-10 NOTE — TELEPHONE ENCOUNTER
Spoke w/Amirah. Amirah states patient is changing to work comp to start 3/27/17. Referral to Dr Tovar placed per Radha ORELLANA.

## 2017-04-10 NOTE — TELEPHONE ENCOUNTER
----- Message from Amirah Higgins sent at 4/10/2017  2:26 PM CDT -----  Contact: Worker Comp Adj: Fazal Pierre   Received call from Worker Comp  Fazal Pierre @ [place] [phone]/f [phone]/ fax [phone] forms [phone], stated okay to treat under w/comp with [employer] effective 3/27/2017. Per Mr. Pierre stated that patient did not tell Dr. Quincy Dougherty that this was a worker comp injury okay to bill [address] [address] [place] [address]. Will need to know if Dr. Low will continued to treat patient     Thanks,     Amirah  Ext [phone]

## 2017-04-12 ENCOUNTER — OFFICE VISIT (OUTPATIENT)
Dept: ORTHOPEDICS | Facility: CLINIC | Age: 42
End: 2017-04-12
Attending: ORTHOPAEDIC SURGERY
Payer: OTHER MISCELLANEOUS

## 2017-04-12 VITALS — HEIGHT: 64 IN | WEIGHT: 143 LBS | BODY MASS INDEX: 24.41 KG/M2

## 2017-04-12 DIAGNOSIS — M25.541 PAIN IN THUMB JOINT WITH MOVEMENT OF RIGHT HAND: Primary | ICD-10-CM

## 2017-04-12 PROCEDURE — 99999 PR PBB SHADOW E&M-EST. PATIENT-LVL II: CPT | Mod: PBBFAC,,, | Performed by: ORTHOPAEDIC SURGERY

## 2017-04-12 PROCEDURE — 99213 OFFICE O/P EST LOW 20 MIN: CPT | Mod: S$GLB,,, | Performed by: ORTHOPAEDIC SURGERY

## 2017-04-12 RX ORDER — MELOXICAM 15 MG/1
15 TABLET ORAL DAILY
Qty: 30 TABLET | Refills: 1 | Status: SHIPPED | OUTPATIENT
Start: 2017-04-12 | End: 2017-05-03 | Stop reason: SDUPTHER

## 2017-04-12 NOTE — PROGRESS NOTES
HISTORY OF PRESENT ILLNESS:  Ms. Clark seen previously by Dr. Kim for   right hand and thumb symptoms.  She is currently in therapy, but has switched   over to me because a Workmen's Comp coverage.    She reports pain in the right hand and wrist, which actually seems to be getting   worse now.  It seems to be radiating up into the elbow both volar and dorsal   and is occasionally associated with a tingling sensation.  Symptoms are worse   during the day with use, particularly with gripping.  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    PHYSICAL EXAMINATION:  RIGHT HAND:  There is no swelling noted.  There is some mild tenderness over the   dorsal and volar aspects of the wrist.  Tinel sign is mildly positive.  Phalen   test negative.  Range of motion in wrist and fingers full.   strength   slightly decreased.  She has a little bit of tenderness at the base of the right   thumb and also the MP joint.  No significant instability either joint.  No   triggering.  Sensation intact.  Mild tenderness over the thenar muscle as well.    The right elbow has full range of motion:  No tenderness, no swelling.  No   instability.    IMAGING:  Previous x-rays reviewed, AP and lateral right hand demonstrate no   abnormalities.    IMPRESSION:  1.  Overuse tendinitis, right hand and wrist.  2.  Possible superimposed right carpal tunnel syndrome.    PLAN:  I am a little bit concerned that her symptoms are getting worse.  It is   possible that the therapy is making her symptoms worse, so I have recommended   that we hold on therapy for now at least until we get things a little bit better   figured out.  Additionally, she seems to be having some symptoms of carpal   tunnel, so I have ordered a nerve conduction test of the right arm to check for   carpal tunnel.    As far as medications, she is just taking ibuprofen from time to time.  I would   like to get  her on a regular dose of something prescription, so I have ordered   Mobic 15 mg once a day with food.  Also, since her symptoms are a little bit   migratory, she might benefit from a topical gel, so I have ordered some Pennsaid   topical anti-inflammatory cream for the right hand and thumb to be used t.i.d.    As far as work is concerned, she really seems to be struggling right now, so I   have recommended she stay off work for three weeks, really give her hand a good   chance to rest, also give us time to get a nerve test and follow up in three   weeks for recheck.      NIMA  dd: 04/12/2017 13:41:37 (CDT)  td: 04/13/2017 07:51:26 (CDT)  Doc ID   #[id number]  Job ID #[id number]    CC:

## 2017-04-27 DIAGNOSIS — G56.00 CARPAL TUNNEL SYNDROME, UNSPECIFIED LATERALITY: Primary | ICD-10-CM

## 2017-05-03 ENCOUNTER — TELEPHONE (OUTPATIENT)
Dept: ORTHOPEDICS | Facility: CLINIC | Age: 42
End: 2017-05-03

## 2017-05-03 ENCOUNTER — OFFICE VISIT (OUTPATIENT)
Dept: ORTHOPEDICS | Facility: CLINIC | Age: 42
End: 2017-05-03
Attending: ORTHOPAEDIC SURGERY
Payer: OTHER MISCELLANEOUS

## 2017-05-03 VITALS — HEIGHT: 64 IN | BODY MASS INDEX: 24.41 KG/M2 | WEIGHT: 143 LBS

## 2017-05-03 DIAGNOSIS — M79.643 PAIN OF HAND, UNSPECIFIED LATERALITY: Primary | ICD-10-CM

## 2017-05-03 PROCEDURE — 99999 PR PBB SHADOW E&M-EST. PATIENT-LVL II: CPT | Mod: PBBFAC,,, | Performed by: ORTHOPAEDIC SURGERY

## 2017-05-03 PROCEDURE — 99213 OFFICE O/P EST LOW 20 MIN: CPT | Mod: 25,S$GLB,, | Performed by: ORTHOPAEDIC SURGERY

## 2017-05-03 PROCEDURE — 20600 DRAIN/INJ JOINT/BURSA W/O US: CPT | Mod: RT,S$GLB,, | Performed by: ORTHOPAEDIC SURGERY

## 2017-05-03 RX ORDER — TRIAMCINOLONE ACETONIDE 40 MG/ML
40 INJECTION, SUSPENSION INTRA-ARTICULAR; INTRAMUSCULAR
Status: COMPLETED | OUTPATIENT
Start: 2017-05-03 | End: 2017-05-03

## 2017-05-03 RX ORDER — MELOXICAM 15 MG/1
15 TABLET ORAL DAILY
Qty: 30 TABLET | Refills: 1 | Status: SHIPPED | OUTPATIENT
Start: 2017-05-03 | End: 2017-06-02

## 2017-05-03 RX ADMIN — TRIAMCINOLONE ACETONIDE 40 MG: 40 INJECTION, SUSPENSION INTRA-ARTICULAR; INTRAMUSCULAR at 01:05

## 2017-05-03 NOTE — LETTER
May 3, 2017      Sienna Kim MD  [address]  [address]  [place] [facility] [address]           [facility]  [address], [address]  [place] [address]  Phone: [phone]          Patient: Stacia Clark   MR Number: [id number]   YOB: 1975   Date of Visit: 5/3/2017       Dear Dr. Sienna Kim:    Thank you for referring Stacia Clark to me for evaluation. Attached you will find relevant portions of my assessment and plan of care.    If you have questions, please do not hesitate to call me. I look forward to following Stacia Clark along with you.    Sincerely,    Cristi Tovar Jr., MD    Enclosure  CC:  No Recipients    If you would like to receive this communication electronically, please contact [email or web address] or [phone] to request more information on [facility] access.    For providers and/or their staff who would like to refer a patient to [facility], please contact us through our one-stop-shop provider referral line, [facility], at [phone].    If you feel you have received this communication in error or would no longer like to receive these types of communications, please e-mail [email or web address]

## 2017-05-03 NOTE — TELEPHONE ENCOUNTER
----- Message from Vira Andino sent at 5/3/2017  8:37 AM CDT -----  Contact: Pt  Pt missed a call and would like the nurse to return their call.        Please call pt at [phone].        Thanks!

## 2017-05-03 NOTE — PROGRESS NOTES
HISTORY OF PRESENT ILLNESS:  Ms. Clark in followup of right hand symptoms,   recently had a nerve conduction study of the right arm, which was negative for   carpal tunnel, basically a normal exam.  I went over that with her today and   gave her a copy of the report.  She continues to have pain in the right hand   despite the fact that she has been off work for two weeks.  In reviewing her   chart, she has been through physical therapy for two different rounds of   treatment without much improvement.  Also, the rest does not seem to have   helped.  She has switched over to meloxicam recently, but she cannot really give   me an answer whether that is helping either.  She describes basically pain in   her entire right hand and difficulty with use.    PHYSICAL EXAMINATION:  RIGHT HAND:  The right hand looks normal.  There is no swelling.  There is no   discoloration.  There are no abnormal findings of the skin.  Range of motion in   wrist and fingers full, but she has some pain on extreme flexion and extreme   extension of the wrist.  Most of the tender area is around the base of the   thumb, although this really does localize to one specific area, but seems may be   present, mostly around the MP joint.    Tendon function is normal.  Sensation intact.    IMPRESSION:  Chronic pain, right hand, probable tendinitis.    PLAN:  I explained to the patient that I do not know if there is an answer to   this problem.  Certainly no surgery is indicated.  We have tried all other   options.  She has not yet had a cortisone injection; however, so I recommended   that today and performed that basically in the right thumb near the MP joint and   also dorsally near the extensor tendon.    I will be curious to see if this helps.  Injection was performed with Kenalog 10   mg, 0.5 mL Xylocaine, sterile technique.    I recommend she continue Mobic and use the thumb splint from time to time.  I am   not really sure what to do about work,  we will keep her off work for another   two weeks and see her back in two weeks' time.  If she is not any better, then I   think we will need to make a determination about what sort of restriction she   can do.  She has basically run the gambit of treatment without much improvement.      NIMA  dd: 05/03/2017 13:40:01 (CDT)  td: 05/04/2017 07:01:40 (CDT)  Doc ID   #[id number]  Job ID #[id number]    CC:

## 2017-05-17 ENCOUNTER — OFFICE VISIT (OUTPATIENT)
Dept: ORTHOPEDICS | Facility: CLINIC | Age: 42
End: 2017-05-17
Attending: ORTHOPAEDIC SURGERY
Payer: OTHER MISCELLANEOUS

## 2017-05-17 VITALS — WEIGHT: 143 LBS | BODY MASS INDEX: 24.41 KG/M2 | HEIGHT: 64 IN

## 2017-05-17 DIAGNOSIS — M79.601 PAIN OF RIGHT UPPER EXTREMITY: Primary | ICD-10-CM

## 2017-05-17 PROCEDURE — 99999 PR PBB SHADOW E&M-EST. PATIENT-LVL II: CPT | Mod: PBBFAC,,, | Performed by: ORTHOPAEDIC SURGERY

## 2017-05-17 PROCEDURE — 99213 OFFICE O/P EST LOW 20 MIN: CPT | Mod: S$GLB,,, | Performed by: ORTHOPAEDIC SURGERY

## 2017-05-17 NOTE — LETTER
May 17, 2017      Sienna Kmi MD  [address]  [address]  [facility] [address]           [facility]  [address], [address]  [facility] [address]  Phone: [phone]          Patient: Stacia Clark   MR Number: [id number]   YOB: 1975   Date of Visit: 5/17/2017       Dear Dr. Sienna Kim:    Thank you for referring Stacia Clark to me for evaluation. Attached you will find relevant portions of my assessment and plan of care.    If you have questions, please do not hesitate to call me. I look forward to following Stacia Clark along with you.    Sincerely,    Cristi Tovar Jr., MD    Enclosure  CC:  No Recipients    If you would like to receive this communication electronically, please contact [email or web address] or [phone] to request more information on [employer] [facility] access.    For providers and/or their staff who would like to refer a patient to [facility], please contact us through our one-stop-shop provider referral line, [facility], at [phone].    If you feel you have received this communication in error or would no longer like to receive these types of communications, please e-mail [email or web address]

## 2017-05-17 NOTE — PROGRESS NOTES
HISTORY OF PRESENT ILLNESS:  Ms. Clark in followup of right hand and wrist   symptoms.  She continues to have pain, which seems to radiate up and down the   entire right arm; although, at last visit, it seemed to be mainly localized to   the right thumb.  Today, it seems to involve the entire right arm including the   elbow and forearm.    Previously, we have tried physical therapy without much improvement and last   visit, we tried an injection, which she says really did not help much.  She is   very frustrated by failure to improve and is not really sure what she can do.    She has been off work for about a month now and she says adamantly that she   cannot return to work.    PHYSICAL EXAMINATION:  RIGHT ARM:  There is no swelling.  There are no specific areas of tenderness.    She has mild diffuse tenderness in the forearm and wrist.  Range of motion in   wrist, fingers and elbow is full.  No instability is noted.  No popping or   clicking noted.  Tendon function normal.  Sensation intact.  Tinel sign   negative.    IMPRESSION:  1.  Right arm pain, unknown etiology.  2.  Mild tendinitis.    PLAN:  I explained to the patient I really cannot come up with an answer for;   certainly not to explain this amount of pain.  Previous nerve test was normal.    I think the only other option would be to try an MRI scan just to see if we are   missing anything in the forearm or wrist area and she is in agreement, so an MRI   was ordered for the right forearm.    In terms of work,  she has been off work, but I think she could return to light   duty, no lifting with the right hand or arm.  She does not seem to agree with me   regarding that, but as I mentioned to her I cannot see anything serious here   that would keep her from doing light duty work.    Continue Mobic by mouth.  Follow up after the MRI is complete.  If the MRI is   negative, then we really will not have anything else to offer her; maybe   referral to the Pain  Clinic.  We did discuss that briefly today.      NIMA  dd: 05/17/2017 14:19:38 (CDT)  td: 05/18/2017 10:12:24 (CDT)  Doc ID   #[id number]  Job ID #[id number]    CC:

## 2017-05-18 ENCOUNTER — TELEPHONE (OUTPATIENT)
Dept: ORTHOPEDICS | Facility: CLINIC | Age: 42
End: 2017-05-18

## 2017-05-18 NOTE — TELEPHONE ENCOUNTER
Attempted to contact pt. No answer noted. Message left with name and call back number in regards to r/s MRI appt.

## 2017-05-23 ENCOUNTER — DOCUMENTATION ONLY (OUTPATIENT)
Dept: REHABILITATION | Facility: HOSPITAL | Age: 42
End: 2017-05-23

## 2017-05-23 PROBLEM — M25.541 PAIN IN THUMB JOINT WITH MOVEMENT OF RIGHT HAND: Status: RESOLVED | Noted: 2017-03-27 | Resolved: 2017-05-23

## 2017-05-23 NOTE — PROGRESS NOTES
Occupational Therapy Discharge Summary    Patient: Stacia Clark  MRN: [id number]     Patient is a referred to Occupational Therapy by Dr. Cirilo Dougherty with a diagnosis of   Hand/wrist pain  and a referral for Eval and Treat . Patient received initial evaluation on  3/27 and has not returned since 4/7 due to switching insurances and switching MDs. Stacia Clark will be discharged from skilled OT services today.

## 2017-05-29 ENCOUNTER — HOSPITAL ENCOUNTER (OUTPATIENT)
Dept: RADIOLOGY | Facility: OTHER | Age: 42
Discharge: HOME | End: 2017-05-29
Attending: ORTHOPAEDIC SURGERY
Payer: OTHER MISCELLANEOUS

## 2017-05-29 DIAGNOSIS — M79.601 PAIN OF RIGHT UPPER EXTREMITY: ICD-10-CM

## 2017-05-29 PROCEDURE — 73218 MRI UPPER EXTREMITY W/O DYE: CPT | Mod: 26,RT,, | Performed by: RADIOLOGY

## 2017-05-29 PROCEDURE — 73218 MRI UPPER EXTREMITY W/O DYE: CPT | Mod: TC,RT

## 2017-05-31 ENCOUNTER — OFFICE VISIT (OUTPATIENT)
Dept: ORTHOPEDICS | Facility: CLINIC | Age: 42
End: 2017-05-31
Attending: ORTHOPAEDIC SURGERY
Payer: OTHER MISCELLANEOUS

## 2017-05-31 VITALS — BODY MASS INDEX: 24.41 KG/M2 | WEIGHT: 143 LBS | HEIGHT: 64 IN

## 2017-05-31 DIAGNOSIS — M79.601 PAIN OF RIGHT UPPER EXTREMITY: Primary | ICD-10-CM

## 2017-05-31 PROCEDURE — 99999 PR PBB SHADOW E&M-EST. PATIENT-LVL III: CPT | Mod: PBBFAC,,, | Performed by: ORTHOPAEDIC SURGERY

## 2017-05-31 PROCEDURE — 99213 OFFICE O/P EST LOW 20 MIN: CPT | Mod: S$GLB,,, | Performed by: ORTHOPAEDIC SURGERY

## 2017-05-31 RX ORDER — DICLOFENAC SODIUM 20 MG/G
SOLUTION TOPICAL
Refills: 3 | COMMUNITY
Start: 2017-04-12

## 2017-05-31 NOTE — PROGRESS NOTES
HISTORY OF PRESENT ILLNESS:  Ms. Clark in followup of right arm symptoms,   recently had an MRI scan of the right forearm and wrist.  The results of the MRI   are consistent with tenosynovitis and tendinitis of the second dorsal   compartment of the wrist dorsally.  I went over this with her today.  There were   no serious problems identified.  No bony lesions.  No ruptures or tears in the   forearm or wrist.    Clinically, she continues to have pain diffusely in the right hand and wrist   dorsally, also in the thumb area and has difficulty with use.  She has not been   back to work.    On examination today, she is diffusely tender over the dorsum of the wrist, has   some pain with resisted wrist extension.  Range of motion of fingers and wrist   full.   strength slightly decreased.  Sensation intact.    IMPRESSION:  Extensor tenosynovitis, right wrist, chronic.    PLAN:  At this point, we have two options, one is referral to the Pain Clinic to   do with her pain; the other option would be trying another round of physical   therapy centered on the wrist tendinitis.    She would like to try the physical therapy, so I will order that at [facility]   where she went previously and try to work on getting the tendinitis better.  I   would like her to continue with Mobic, part-time use wrist brace and the   Pennsaid topical anti-inflammatory cream.    She should also remain on light duty work.  No lifting more than 10 pounds.    She will follow up in one month for recheck.  If she continues to have problems   without improvement, then I think we will need to get a functional capacity   evaluation and make a determination about MMI.      NIMA  dd: 05/31/2017 13:19:01 (CDT)  td: 06/01/2017 11:18:00 (CDT)  Doc ID   #[id number]  Job ID #[id number]    CC:

## 2017-06-14 ENCOUNTER — CLINICAL SUPPORT (OUTPATIENT)
Dept: REHABILITATION | Facility: HOSPITAL | Age: 42
End: 2017-06-14
Attending: ORTHOPAEDIC SURGERY
Payer: OTHER MISCELLANEOUS

## 2017-06-14 DIAGNOSIS — M25.531 RIGHT WRIST PAIN: ICD-10-CM

## 2017-06-14 DIAGNOSIS — M79.644 PAIN OF RIGHT THUMB: ICD-10-CM

## 2017-06-14 PROCEDURE — 97165 OT EVAL LOW COMPLEX 30 MIN: CPT | Mod: PO

## 2017-06-14 NOTE — PROGRESS NOTES
Occupational Therapy -Hand / Wrist  Evaluation    Patient: Stacia Clark  Date of Evaluation: 6/14/2017  Referring Physician: Cristi Tovar Jr., *  Diagnosis:   1. Right wrist pain     2. Pain of right thumb       MRN: [id number]  Age: 41 y.o.  Sex: female     Referral Orders:   Eval and treat   Visits Approved: 12    Start Time: 2  End Time: 3  Total Time: 60 min     Hand dominance: Right      Subjective     Stacia is a 41 y.o. female that presents to clinic secondary to pain in R wrist and thumb. MRI was taken and revealed tenosynovitis/tendonitis of 1st and 2nd dorsal extensor compartments of R wrist. Previous treatment included OT at [facility] with Ana, and a steroid shot to volar thumb area. Pt reports that her wrist has not gotten any better and verbalizes frustration with her care up to this point. EMG was normal. Pt reports that using the wrist too much, grasping things, opening bottles, picking up large objects increases wrist pain and reports pain at worst is a 9 on the VAS. Pt uses mobix to control pain symptoms. No cultural, environmental, or spiritual barriers identified to treatment or learning.    Pain:   Functional Pain Scale Rating 0-10: 7/10 on average  Location: constant pain in volar forearm, some in elbow, thumb MP joint  Description: Aching and Sharp      Functional Limitations: Patient presents with the following functional Limitations affecting ADLS, work and leisure tasks:   Previous functional status includes: Independent with all ADLs.     Current FunctionalStatus:   DME owned:  R thumb spica brace   Home/Living environment : lives alone      Limitation of Functional Status as follows:   ADLs:     - Feeding: Independent    - Bathing: Independent    - Dressing: wearing looser clothing so it's easier    - Grooming: uses L hand to brush hair due to pain      IADLs:    - managing finances: Independent    - driving/handling transportation: pain using R hand, turning key in ignition    -  shopping: doesn't hold grocery bags with R hand    - using phone: increases pain    - computer: increases pain    - managing medications: pain opening bottles    - housework & home maintenance: cleaning, washing dishes with L hand        Occupation:  Shipping   Working presently: disability  Workmen's Compensation:  yes  Duties Prior to Injury: making boxes, packaging  Unable to perform due to injury: not able to return to work yet    Past Medical History/Physical Systems Review:   No past medical history on file.    Allergies:  Review of patient's allergies indicates:  No Known Allergies    Barriers:  Environmental Concerns/ Fall Risk:  None  Barriers to Learning: None   Cultural/Spiritual : None   Developmental/Education: None   Abuse/ Neglect: none   Nutritional Deficit: None   Language: None   Hearing/Vision Deficit: None   Other: no pacemaker, no CA    Objective     Observation:   Pt wearing a thumb spica brace on R wrist    Sensation:   Pt reports some numbness and tingling in R fingertips and distal end of thumb    Special Tests:   Finkelstein's Test - positive  Grind test - mild positive  Scaphoid shift test - mild positive  Speed's test - positive  Median and radial neural tension indicated by screening from PT    Range of Motion:     Wrist ROM: right  Flexion 95   Extension 50 (some pain)   Radial Deviation 35   Ulnar Deviation 50   Supination 55 (some pain)   Pronation 85      Strength: (RODNEY Dynamometer in lbs.) Average 3 trials, Position II  Right: 28# (pain)  Left: 50#    Pinch Strength: (Pinch Gauge in psi's), Average 3 trials  Cornejo Pinch  R) 5 psi's    L) 8 psi's  3pt Pinch    R) 5 psi's   L) 8 psi's  2pt Pinch    R) 4 psi's    L) 4 psi's      OUTCOME MEASURE: FOTO    Category: Self Care      Current Score  = 40% or 60% impaired  Goal at Discharge Score = 54% or 46% impaired    Score interpretation is as follows:  CL = least 60% but < 80% impaired, limited or  restricted    Treatment today included: Screen for neural tension in neck by PT. Patient received paraffin bath to R hand for 10 minutes to increase blood flow, circulation, pain management and for tissue elasticity prior to HEP.     Assessment     This is a 41 y.o. female referred to outpatient occupational/hand therapy and presents with a medical diagnosis of R dequervain's with median/radial neural tension and some wrist laxity and demonstrates limitations as described in the problem list/chart below. Following low medical record review it is determined that pt will benefit from occupational therapy services in order to maximize pain free and/or functional use of right hand. The following goals were discussed with the patient and patient is in agreement with them as to be addressed in the treatment plan. Pt was given a HEP consisting of brachial plexus glides. Pt verbally understood the instructions as they were given and demonstrated proper form and technique during therapy. Pt was advise to perform these exercises free of pain, and to stop performing them if pain occurs. The patient's rehab potential is good.     History Examination Decision Making Complexity Score   Occupational Profile: Pt is a 42yo RHD female with R DeQuervain's and neural tension of the median and radial nerves. She works as a  and lives alone. She is having difficulty with ADLs and IADLs due to performance deficits.    Comorbidities: none    Medical and Therapy History Review: low                 Performance Deficits:   - Dressing: wearing looser clothing so it's easier  - Grooming: uses L hand to brush hair due to pain  - managing finances: Independent  - driving/handling transportation: pain using R hand, turning key in ignition  - shopping: doesn't hold grocery bags with R hand  - using phone: increases pain  - computer: increases pain  - managing medications: pain opening bottles  - housework & home maintenance:  cleaning, washing dishes with L hand    Physical:  Decreased ROM   Decreased  and pinch strength   Decreased muscle strength   Decreased functional hand use   Increased pain     Cognitive:  WNL    Psychosocial:    WNL     Modification of tasks during evaluation: Clinical decision making of low complexity, which includes an analysis of the occupational profile, analysis of date from problem focused assessments, and the consideration of limited treatment options. Patient presents with few comorbidities that affect occupational performance. No modification(s) of tasks or assistance with assessments is necessary to enable completion of evaluation component.   low  Based on PMHX, co morbidities , data from assessments and functional level of assistance required with task and clinical presentation directly impacting function.            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  3)   Increase  strength 10 lbs. to grasp boxes for work.  4)   Increase pinch 1-3 psis for buttons and fasteners.  5)   Decrease complaints of pain to  2 out of 10 to increase functional hand use for ADL/work/leisure activities.  6)   Patient to score at 54% or more on FOTO to demonstrate improved perception of functional hand use.        Plan     Pt to be treated by Occupational Therapy 2 times per week for 6 weeks during the certification period from 6/14/17 to 7/21/17 to achieve the established goals.     Treatment to include: Paraffin, Fluidotherapy, Manual therapy/joint mobilizations, Modalities for pain management, US 3 mhz, Therapeutic exercises/activities., Iontophoresis with 2.0 cc Dexamethasone, Strengthening, Orthotic Fabrication/Fit/Training and Electrical Modalities, as well as any other treatments deemed necessary based on the patient's needs or  progress.                   I certify the need for these services furnished under this plan of treatment and while under my care    ____________________________________                         __________________  Physician/Referring Practitioner                                               Date of Signature

## 2017-06-16 ENCOUNTER — CLINICAL SUPPORT (OUTPATIENT)
Dept: REHABILITATION | Facility: HOSPITAL | Age: 42
End: 2017-06-16
Attending: ORTHOPAEDIC SURGERY
Payer: OTHER MISCELLANEOUS

## 2017-06-16 DIAGNOSIS — M25.531 RIGHT WRIST PAIN: ICD-10-CM

## 2017-06-16 DIAGNOSIS — M79.644 PAIN OF RIGHT THUMB: ICD-10-CM

## 2017-06-16 PROCEDURE — 97110 THERAPEUTIC EXERCISES: CPT | Mod: PO

## 2017-06-16 PROCEDURE — 97140 MANUAL THERAPY 1/> REGIONS: CPT | Mod: PO

## 2017-06-16 PROCEDURE — 97018 PARAFFIN BATH THERAPY: CPT | Mod: PO

## 2017-06-19 NOTE — PROGRESS NOTES
"OT Daily Progress Note    Patient:  Stacia Clark  [facility] #:  [id number]   Date of Note: 06/19/2017   Referring Physician:  Cristi Tovar Jr., *  Diagnosis:   Encounter Diagnoses   Name Primary?    Right wrist pain     Pain of right thumb         Start Time: 2pm  End Time: 3pm  Total Time: 60 min  Group Time: -    Visit 2 of 12 authorized      Subjective:   "After the eval I had to go home and take some medicine and go to bed it was really killing me"  Pain: 6 out of 10     Objective:   Patient seen by OT this session. Treatment  consist of the following:  Paraffin, Fluidotherapy, Ultrasound, manual therapy/joint mobilization and Therapeutic exercises    Patient received paraffin bath to R hand(s) for 10 minutes to increase blood flow, circulation, pain management and for tissue elasticity prior to therex. Patient received ultrasound to  R 1st dorsal compartement area to increase blood flow, circulation, tissue elasticity, pain management and for wound/scar management for 8 minutes @ 3.3 Mhz, Intensity 0.8 w/cm2 at 50* duty cycle. Patient received manual brachial nerve glides for R UE x 10 min for stretching and flossing to decrease pain and impingement. Pt received cross friction massage to 1st dorsal compartment x 10 minutes to trigger an inflammatory healing response and stimulate the production of new collagen and proper, more functional, less painful healing. Pt was instructed on and participated in wrist ROM (flex/ext, rd/ud, sup/pro) and light sponge squeeze x 2 min each direction. Reviewed HEP and modality use for pain management with patient reporting good understanding of same.     Treatment: Paraffin x 10 min, Ultrasound x 8 min, Therapeutic exercises x 15 min and Manual therapy x 20 min     Assessment:  Pt will continue to benefit from skilled OT intervention.  Pt had pain through treatment today especially with nerve glides with hand in neutral however tolerated most motions in fluido without " adverse effects. Pt still with symptoms of deQuervain's tenosynovitis however overall arm pain due to neural tension.  Patient continues to demonstrate limitation  with  Decreased fine motor coordination, Decreased functional use, Continued pain and Continued inflammation all of which interfere with pt's vocational and leisurely pursuits.         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  3)   Increase  strength 10 lbs. to grasp boxes for work.  4)   Increase pinch 1-3 psis for buttons and fasteners.  5)   Decrease complaints of pain to  2 out of 10 to increase functional hand use for ADL/work/leisure activities.  6)   Patient to score at 54% or more on FOTO to demonstrate improved perception of functional hand use.        Plan:  Pt to be treated by Occupational Therapy 2 times per week for 6 weeks during the certification period from 6/14/17 to 7/21/17 to achieve the established goals.

## 2017-06-21 ENCOUNTER — CLINICAL SUPPORT (OUTPATIENT)
Dept: REHABILITATION | Facility: HOSPITAL | Age: 42
End: 2017-06-21
Attending: ORTHOPAEDIC SURGERY
Payer: OTHER MISCELLANEOUS

## 2017-06-21 DIAGNOSIS — M79.644 PAIN OF RIGHT THUMB: ICD-10-CM

## 2017-06-21 DIAGNOSIS — M25.531 RIGHT WRIST PAIN: ICD-10-CM

## 2017-06-21 PROCEDURE — 97110 THERAPEUTIC EXERCISES: CPT | Mod: PO

## 2017-06-21 PROCEDURE — 97140 MANUAL THERAPY 1/> REGIONS: CPT | Mod: PO

## 2017-06-21 PROCEDURE — 97018 PARAFFIN BATH THERAPY: CPT | Mod: PO

## 2017-06-21 NOTE — PROGRESS NOTES
"OT Daily Progress Note    Patient:  Stacia Clark  [facility] #:  [id number]   Date of Note: 06/21/2017   Referring Physician:  Cristi Tovar Jr., *  Diagnosis: R DeQuervain's; neural tension  Encounter Diagnoses   Name Primary?    Right wrist pain     Pain of right thumb         Start Time: 1pm  End Time: 2pm  Total Time: 60 min  Group Time: 20    Visit 3 of 12 authorized      Subjective:   "I think some of the stretches may have helped in my upper arm and elbow. I wore the compression sleeve for 2 days but then I took it on and off because it was digging into my elbow. My wrist feels about the same."  Pain: 6 out of 10     Objective:   Patient seen by OT this session. Treatment  consist of the following:  Paraffin, Fluidotherapy, Ultrasound, manual therapy/joint mobilization and Therapeutic exercises    Patient received paraffin bath to R hand for 10 minutes to increase blood flow, circulation, pain management and for tissue elasticity prior to therex. Patient received ultrasound to  R 1st dorsal compartement area to increase blood flow, circulation, tissue elasticity, pain management and for wound/scar management for 8 minutes @ 3.3 Mhz, Intensity 0.8 w/cm2 at 50* duty cycle. Patient received manual brachial nerve glides for R UE x 10 min for stretching and flossing to decrease pain and impingement. Pt received cross friction massage to 1st dorsal compartment x 10 minutes to trigger an inflammatory healing response and stimulate the production of new collagen and proper, more functional, less painful healing. Patient received fluidotherapy to R hand for 12 minutes to increase blood flow, circulation, desensitization, sensory re-education and for pain management. Pt was instructed on and performed the following exercises while in fluido: wrist ROM (flex/ext, rd/ud, sup/pro), thumb ROM (radial ABD, palmar ABD), and light sponge squeeze x 2 min each . Reviewed HEP and modality use for pain management with patient " "reporting good understanding of same.     Treatment: Paraffin x 10 min, Ultrasound x 8 min, Therapeutic exercises x 15 min and Manual therapy x 20 min     Assessment:  Pt will continue to benefit from skilled OT intervention. Pt reports that nerve glides may have helped relieve some tension in her shoulder and elbow area of her arm. Pt still with symptoms of deQuervain's tenosynovitis however overall arm pain due to neural tension. She reports that the most painful motion in fluido is the ball squeeze. Pt reports most pain with manual nerve glides with shoulder abduction to 90 and higher.  Patient continues to demonstrate limitation  with  Decreased fine motor coordination, Decreased functional use, Continued pain and Continued inflammation all of which interfere with pt's vocational and leisurely pursuits.         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  3)   Increase  strength 10 lbs. to grasp boxes for work.  4)   Increase pinch 1-3 psis for buttons and fasteners.  5)   Decrease complaints of pain to  2 out of 10 to increase functional hand use for ADL/work/leisure activities.  6)   Patient to score at 54% or more on FOTO to demonstrate improved perception of functional hand use.        Plan:  Pt to be treated by Occupational Therapy 2 times per week for 6 weeks during the certification period from 6/14/17 to 7/21/17 to achieve the established goals.           Student: LYNDA Longo    " I certify that I was present in the room directing the student in service delivery and guiding them using my skilled judgement. As the co-signing therapist, I have reviewed the student's documentation and am responsible for the treatment, assessment and plan."    Therapist: Ana MARES, MECCA RUBIO  "

## 2017-06-22 ENCOUNTER — CLINICAL SUPPORT (OUTPATIENT)
Dept: REHABILITATION | Facility: HOSPITAL | Age: 42
End: 2017-06-22
Attending: ORTHOPAEDIC SURGERY
Payer: OTHER MISCELLANEOUS

## 2017-06-22 DIAGNOSIS — M79.644 PAIN OF RIGHT THUMB: ICD-10-CM

## 2017-06-22 DIAGNOSIS — M25.531 RIGHT WRIST PAIN: ICD-10-CM

## 2017-06-22 PROCEDURE — 97035 APP MDLTY 1+ULTRASOUND EA 15: CPT | Mod: PO

## 2017-06-22 PROCEDURE — 97140 MANUAL THERAPY 1/> REGIONS: CPT | Mod: PO

## 2017-06-22 PROCEDURE — 97110 THERAPEUTIC EXERCISES: CPT | Mod: PO

## 2017-06-22 PROCEDURE — 97018 PARAFFIN BATH THERAPY: CPT | Mod: PO

## 2017-06-22 NOTE — PROGRESS NOTES
"OT Daily Progress Note    Patient:  Stacia Clark  [facility] #:  [id number]   Date of Note: 06/22/2017   Referring Physician:  Cristi Tovar Jr., *  Diagnosis: R DeQuervain's; neural tension  1. Right wrist pain     2. Pain of right thumb         Start Time: 11pm  End Time: 12pm  Total Time: 60 min  Group Time: -    Visit 4 of 12 authorized      Subjective:   "I think some of the stretches may have helped in my upper arm and elbow. I wore the compression sleeve for 2 days but then I took it on and off because it was digging into my elbow. My wrist feels about the same."  Pain: 6 out of 10     Objective:   Patient seen by OT this session. Treatment  consist of the following:  Paraffin, Fluidotherapy, Ultrasound, manual therapy/joint mobilization and Therapeutic exercises    Patient received paraffin bath to R hand for 10 minutes to increase blood flow, circulation, pain management and for tissue elasticity prior to therex. Patient received ultrasound to  R 1st dorsal compartement area to increase blood flow, circulation, tissue elasticity, pain management and for wound/scar management for 4 minutes @ 3.3 Mhz, Intensity 0.8 w/cm2 at 50* duty cycle and 4 minutes to R extensor bundle @ 1.0 Mhz, Intensity 1.0 w/cm2 at 100* duty cycle. Pt received cross friction massage to 1st dorsal compartment x 10 minutes to trigger an inflammatory healing response and stimulate the production of new collagen and proper, more functional, less painful healing. Patient received fluidotherapy to R hand for 12 minutes to increase blood flow, circulation, desensitization, sensory re-education and for pain management. Pt was instructed on and performed the following exercises while in fluido: wrist ROM (flex/ext, rd/ud, sup/pro), thumb ROM (radial ABD, palmar ABD), and composite fist pumping x 2 min each. Patient received manual brachial nerve glides for R UE x 10 min for stretching and flossing to decrease pain and impingement. Reviewed " HEP and modality use for pain management with patient reporting good understanding of same.     Treatment: Paraffin x 10 min, Ultrasound x 8 min, Therapeutic exercises x 15 min and Manual therapy x 20 min     Assessment:  Pt will continue to benefit from skilled OT intervention. Pt reports that nerve glides may have helped relieve some tension in her shoulder and elbow area of her arm. Pt still with symptoms of deQuervain's tenosynovitis however overall arm pain due to neural tension. Pt reports that wrist pain is radiating up far into forearm. Pt reports most pain with manual nerve glides with shoulder abduction to 90 and higher, and that she feels pain in her wrist with manual movement.  Patient continues to demonstrate limitation  with  Decreased fine motor coordination, Decreased functional use, Continued pain and Continued inflammation all of which interfere with pt's vocational and leisurely pursuits.         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  3)   Increase  strength 10 lbs. to grasp boxes for work.  4)   Increase pinch 1-3 psis for buttons and fasteners.  5)   Decrease complaints of pain to  2 out of 10 to increase functional hand use for ADL/work/leisure activities.  6)   Patient to score at 54% or more on FOTO to demonstrate improved perception of functional hand use.        Plan:  Pt to be treated by Occupational Therapy 2 times per week for 6 weeks during the certification period from 6/14/17 to 7/21/17 to achieve the established goals.

## 2017-06-23 ENCOUNTER — CLINICAL SUPPORT (OUTPATIENT)
Dept: REHABILITATION | Facility: HOSPITAL | Age: 42
End: 2017-06-23
Attending: ORTHOPAEDIC SURGERY
Payer: OTHER MISCELLANEOUS

## 2017-06-23 DIAGNOSIS — M79.644 PAIN OF RIGHT THUMB: ICD-10-CM

## 2017-06-23 DIAGNOSIS — M25.531 RIGHT WRIST PAIN: ICD-10-CM

## 2017-06-23 PROCEDURE — 97035 APP MDLTY 1+ULTRASOUND EA 15: CPT | Mod: PO

## 2017-06-23 PROCEDURE — 97018 PARAFFIN BATH THERAPY: CPT | Mod: PO

## 2017-06-23 PROCEDURE — 97140 MANUAL THERAPY 1/> REGIONS: CPT | Mod: PO

## 2017-06-23 PROCEDURE — 97110 THERAPEUTIC EXERCISES: CPT | Mod: PO

## 2017-06-23 NOTE — PROGRESS NOTES
"OT Daily Progress Note    Patient:  Stacia Clark  [facility] #:  [id number]   Date of Note: 06/23/2017   Referring Physician:  Cristi Tovar Jr., *  Diagnosis: R DeQuervain's; neural tension  1. Right wrist pain     2. Pain of right thumb         Start Time: 12:50 pm  End Time: 1:50 pm  Total Time: 60 min  Group Time: -    Visit 5 of 12 authorized      Subjective:   "The wrist is hurting most today. I did the brachial plexus glides last night and today."  Pain: 6 out of 10     Objective:   Patient seen by OT this session. Treatment  consist of the following:  Paraffin, Fluidotherapy, Ultrasound, manual therapy/joint mobilization and Therapeutic exercises    Patient received paraffin bath to R hand for 10 minutes to increase blood flow, circulation, pain management and for tissue elasticity prior to therex. Patient received ultrasound to R 1st dorsal compartment area to increase blood flow, circulation, tissue elasticity, pain management and for wound/scar management for 4 minutes @ 3.3 Mhz, Intensity 0.8 w/cm2 at 50* duty cycle. Pt received cross friction massage to 1st dorsal compartment x 8 minutes to trigger an inflammatory healing response and stimulate the production of new collagen and proper, more functional, less painful healing. Patient received fluidotherapy to R hand for 12 minutes to increase blood flow, circulation, desensitization, sensory re-education and for pain management. Pt was instructed on and performed the following exercises while in fluido: wrist ROM (flex/ext, rd/ud, sup/pro), thumb ROM (radial ABD, palmar ABD), and composite fist pumping x 2 min each. Patient received manual brachial nerve glides for R UE x 5 min for stretching and flossing to decrease pain and impingement. Pt performed scapular retraction and shoulder extension x 15 reps with yellow tband to strengthen scapulas and open up chest area to relieve neural compression. Kinesiotaped to reposition humeral head and open chest " area. Reviewed HEP and modality use for pain management with patient reporting good understanding of same.     Treatment: Paraffin x 10 min, Ultrasound x 8 min, Therapeutic exercises x 20 min and Manual therapy x 15 min     Assessment:  Pt will continue to benefit from skilled OT intervention. Pt reports compliance with HEP. Pt complained of some shooting pains in wrist and forearm with wrist and thumb exercises in fluido today. Added scapular resistance exercises today to open up chest area in attempt to relieve compression. Pt tolerated all upgrades well with no adverse effects. Patient continues to demonstrate limitation  with  Decreased fine motor coordination, Decreased functional use, Continued pain and Continued inflammation all of which interfere with pt's vocational and leisurely pursuits.         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  3)   Increase  strength 10 lbs. to grasp boxes for work.  4)   Increase pinch 1-3 psis for buttons and fasteners.  5)   Decrease complaints of pain to  2 out of 10 to increase functional hand use for ADL/work/leisure activities.  6)   Patient to score at 54% or more on FOTO to demonstrate improved perception of functional hand use.        Plan:  Pt to be treated by Occupational Therapy 2 times per week for 6 weeks during the certification period from 6/14/17 to 7/21/17 to achieve the established goals.

## 2017-06-26 ENCOUNTER — CLINICAL SUPPORT (OUTPATIENT)
Dept: REHABILITATION | Facility: HOSPITAL | Age: 42
End: 2017-06-26
Attending: ORTHOPAEDIC SURGERY
Payer: OTHER MISCELLANEOUS

## 2017-06-26 DIAGNOSIS — M25.531 RIGHT WRIST PAIN: ICD-10-CM

## 2017-06-26 DIAGNOSIS — M79.644 PAIN OF RIGHT THUMB: ICD-10-CM

## 2017-06-26 PROCEDURE — 97110 THERAPEUTIC EXERCISES: CPT | Mod: PO

## 2017-06-26 PROCEDURE — 97140 MANUAL THERAPY 1/> REGIONS: CPT | Mod: PO

## 2017-06-26 PROCEDURE — 97018 PARAFFIN BATH THERAPY: CPT | Mod: PO

## 2017-06-26 NOTE — PROGRESS NOTES
"OT Daily Progress Note    Patient:  Stacia Clark  [facility] #:  [id number]   Date of Note: 06/26/2017   Referring Physician:  Cristi Tovar Jr., *  Diagnosis: R DeQuervain's; neural tension  1. Right wrist pain     2. Pain of right thumb         Start Time: 12:45 pm  End Time: 1:45 pm  Total Time: 60 min  Group Time: 10    Visit 6 of 12 authorized      Subjective:   "I did a little too much yesterday and felt some numbness, but it's mostly the same in the wrist. I don't feel as much elbow pain"  Pain: 6 out of 10     Objective:   Patient seen by OT this session. Treatment  consist of the following:  Paraffin, Fluidotherapy, Ultrasound, manual therapy/joint mobilization and Therapeutic exercises    Patient received paraffin bath to R hand for 10 minutes to increase blood flow, circulation, pain management and for tissue elasticity prior to therex. Patient received ultrasound to R 1st dorsal compartment area to increase blood flow, circulation, tissue elasticity, pain management and for wound/scar management for 8 minutes @ 3.3 Mhz, Intensity 0.8 w/cm2 at 50* duty cycle. Pt received cross friction massage to 1st dorsal compartment x 8 minutes to trigger an inflammatory healing response and stimulate the production of new collagen and proper, more functional, less painful healing. Patient received fluidotherapy to R hand for 12 minutes to increase blood flow, circulation, desensitization, sensory re-education and for pain management. Pt was instructed on and performed the following exercises while in fluido: wrist ROM (flex/ext, rd/ud, sup/pro), thumb ROM (radial ABD, palmar ABD), and composite fist pumping x 2 min each. Patient received manual brachial nerve glides for R UE x 5 min for stretching and flossing to decrease pain and impingement. Pt performed scapular retraction and shoulder extension x 15 reps with yellow tband to strengthen scapulas and open up chest area to relieve neural compression. Kinesiotaped to " reposition humeral head and open chest area. Reviewed HEP and modality use for pain management with patient reporting good understanding of same.     Treatment: Paraffin x 10 min, Ultrasound x 8 min, Therapeutic exercises x 20 min and Manual therapy x 15 min     Assessment:  Pt will continue to benefit from skilled OT intervention. Pt reports compliance with HEP. Pt complained of some shooting pains in wrist and forearm with wrist and thumb exercises in fluido today. Added scapular resistance exercises today to open up chest area in attempt to relieve compression. Pt tolerated all upgrades well with no adverse effects. Plan to reassess next session due to 6th visit and will evaluate need to dry needling with PT. Patient continues to demonstrate limitation  with  Decreased fine motor coordination, Decreased functional use, Continued pain and Continued inflammation all of which interfere with pt's vocational and leisurely pursuits.         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  3)   Increase  strength 10 lbs. to grasp boxes for work.  4)   Increase pinch 1-3 psis for buttons and fasteners.  5)   Decrease complaints of pain to  2 out of 10 to increase functional hand use for ADL/work/leisure activities.  6)   Patient to score at 54% or more on FOTO to demonstrate improved perception of functional hand use.        Plan:  Pt to be treated by Occupational Therapy 2 times per week for 6 weeks during the certification period from 6/14/17 to 7/21/17 to achieve the established goals.

## 2017-06-28 ENCOUNTER — OFFICE VISIT (OUTPATIENT)
Dept: ORTHOPEDICS | Facility: CLINIC | Age: 42
End: 2017-06-28
Attending: ORTHOPAEDIC SURGERY
Payer: OTHER MISCELLANEOUS

## 2017-06-28 VITALS — HEIGHT: 64 IN | WEIGHT: 143 LBS | BODY MASS INDEX: 24.41 KG/M2

## 2017-06-28 DIAGNOSIS — M79.644 PAIN OF RIGHT THUMB: Primary | ICD-10-CM

## 2017-06-28 PROCEDURE — 99213 OFFICE O/P EST LOW 20 MIN: CPT | Mod: S$GLB,,, | Performed by: ORTHOPAEDIC SURGERY

## 2017-06-28 PROCEDURE — 99999 PR PBB SHADOW E&M-EST. PATIENT-LVL II: CPT | Mod: PBBFAC,,, | Performed by: ORTHOPAEDIC SURGERY

## 2017-06-28 NOTE — PROGRESS NOTES
HISTORY OF PRESENT ILLNESS:  Ms. Clark in followup of chronic pain, right arm.    She continues to have pain in the right arm, which radiates from the elbow   down to the hand and into the thumb.  Basically, she seems to have sort of   migratory symptoms.  Each time they seems to be coming from a different area and   I think this is consistent with chronic tendinitis.    She is currently in therapy, reports some slight improvement, but she is still   having severe burning pain when she uses the right hand for any gripping and   lifting.    PHYSICAL EXAMINATION:  There is no swelling, no specific areas of tenderness.    Range of motion elbow, wrist and fingers full.   strength is limited, but   effort appears to be poor.  Sensation intact.    PLAN:  We had a little discussion today about the endgame and I think we should   go ahead and finish up this round of therapy and then get functional capacities   evaluation for her.  At that point, she can make a determination about whether   she wants to continue with her current employer or look for another job, which I   think most likely would be the best case for her because I do not think she   will be able to return to her previous level of employment.    We will try to make referral through the [facility] system, if not elsewhere for   FCE.  Follow up after the FCE is complete.  Continue current medications.      NIMA  dd: 06/28/2017 13:36:12 (CDT)  td: 07/13/2017 04:50:38 (CDT)  Doc ID   #[id number]  Job ID #[id number]    CC:

## 2017-06-29 NOTE — PROGRESS NOTES
"OT Daily Progress Note    Patient:  Stacia Clark  [facility] #:  [id number]   Date of Note: 06/29/2017   Referring Physician:  Cristi Tovar Jr., *  Diagnosis: R DeQuervain's; neural tension  1. Right wrist pain     2. Pain of right thumb         Start Time: 1:00 pm  End Time: 2:00 pm  Total Time: 60 min  Group Time: 20    Visit 7 of 12 authorized      Subjective:   "I did a little too much yesterday and felt some numbness, but it's mostly the same in the wrist. I don't feel as much elbow pain"  Pain: 6 out of 10     Objective:   Patient seen by OT this session. Treatment  consist of the following:  Paraffin, Fluidotherapy, Ultrasound, manual therapy/joint mobilization and Therapeutic exercises    Reassessment as follows:    Special Tests:   Finkelstein's Test - positive  Grind test - mild positive  Scaphoid shift test - mild positive  Speed's test - positive  Median and radial neural tension indicated by screening from PT     Range of Motion:      Wrist ROM: right - visible shaking in wrists with flexion/extension  Flexion 65 (-30)   Extension 50 (some pain) (=)   Radial Deviation 35 (=)   Ulnar Deviation 50  (=)   Supination 75 (+20)   Pronation 85 (=)       Strength: (RODNEY Dynamometer in lbs.) Average 3 trials, Position II  Right: 38# (pain) (+10)  Left: 50#     Pinch Strength: (Pinch Gauge in psi's), Average 3 trials  Cornejo Pinch                  R) 5 psi's (=)                  L) 8 psi's  3pt Pinch                   R) 5 psi's  (=)                 L) 8 psi's  2pt Pinch                   R) 4 psi's  (=)                 L) 4 psi's    Patient received paraffin bath to R hand for 10 minutes to increase blood flow, circulation, pain management and for tissue elasticity prior to therex. Patient received ultrasound to R 1st dorsal compartment area to increase blood flow, circulation, tissue elasticity, pain management and for wound/scar management for 8 minutes @ 3.3 Mhz, Intensity 0.8 w/cm2 at 100* duty cycle. Pt " received cross friction massage to 1st dorsal compartment x 10 minutes to trigger an inflammatory healing response and stimulate the production of new collagen and proper, more functional, less painful healing. Patient received fluidotherapy to R hand for 14 minutes to increase blood flow, circulation, desensitization, sensory re-education and for pain management. Pt was instructed on and performed the following exercises while in fluido: wrist ROM (flex/ext, rd/ud, sup/pro), thumb ROM (radial ABD, palmar ABD), opposition, and composite fist pumping x 2 min each. Reviewed HEP and modality use for pain management with patient reporting good understanding of same.     Treatment: Paraffin x 10 min, Ultrasound x 8 min, Therapeutic exercises x 25 min and Manual therapy x 10 min     Assessment:  Pt will continue to benefit from skilled OT intervention. Pt reports compliance with HEP. Upon reassessment, pt shows improvement in  strength but has remained the same in all other objective categories. Pt's wrist AROM flexion has decreased, and pt was visibly shaky in wrist during measurements of flexion and extension. Pt still with pain and loss of motion affecting function including ADLs and work tasks. Patient continues to demonstrate limitation  with  Decreased fine motor coordination, Decreased functional use, Continued pain and Continued inflammation all of which interfere with pt's vocational and leisurely pursuits.         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  3)   Increase  strength 10 lbs. to grasp boxes for work.  4)   Increase pinch 1-3 psis for buttons and fasteners.  5)   Decrease complaints of pain to  2 out of 10 to increase functional hand use for ADL/work/leisure activities.  6)   Patient to score at 54% or  more on FOTO to demonstrate improved perception of functional hand use.        Plan:  Pt to be treated by Occupational Therapy 2 times per week for 6 weeks during the certification period from 6/14/17 to 7/21/17 to achieve the established goals.

## 2017-06-30 ENCOUNTER — CLINICAL SUPPORT (OUTPATIENT)
Dept: REHABILITATION | Facility: HOSPITAL | Age: 42
End: 2017-06-30
Attending: ORTHOPAEDIC SURGERY
Payer: OTHER MISCELLANEOUS

## 2017-06-30 DIAGNOSIS — M79.644 PAIN OF RIGHT THUMB: ICD-10-CM

## 2017-06-30 DIAGNOSIS — M25.531 RIGHT WRIST PAIN: ICD-10-CM

## 2017-06-30 PROCEDURE — 97018 PARAFFIN BATH THERAPY: CPT | Mod: PO

## 2017-06-30 PROCEDURE — 97110 THERAPEUTIC EXERCISES: CPT | Mod: PO

## 2017-06-30 PROCEDURE — 97140 MANUAL THERAPY 1/> REGIONS: CPT | Mod: PO

## 2017-07-03 ENCOUNTER — CLINICAL SUPPORT (OUTPATIENT)
Dept: REHABILITATION | Facility: HOSPITAL | Age: 42
End: 2017-07-03
Attending: ORTHOPAEDIC SURGERY
Payer: OTHER MISCELLANEOUS

## 2017-07-03 DIAGNOSIS — M25.531 RIGHT WRIST PAIN: ICD-10-CM

## 2017-07-03 DIAGNOSIS — M79.644 PAIN OF RIGHT THUMB: ICD-10-CM

## 2017-07-03 PROCEDURE — 97140 MANUAL THERAPY 1/> REGIONS: CPT | Mod: PO

## 2017-07-03 PROCEDURE — 97110 THERAPEUTIC EXERCISES: CPT | Mod: PO

## 2017-07-03 PROCEDURE — 97018 PARAFFIN BATH THERAPY: CPT | Mod: PO

## 2017-07-03 NOTE — PATIENT INSTRUCTIONS
Composite Flexion (Active Extensor Stretch)        Curl fingers into fist, bend wrist down, and straighten elbow. Hold 5-10 seconds as tolerated, perform 10x, 2x/day. THUMB NOT INCLUDED        Wrist Extension (Flexibility)    1. Stand or sit and hold your arms straight out in front of you.  2. Bend your right hand back at the wrist. Gently pull back on your right hand with your left hand. Dont bend your fingertips backward. Feel the stretch in your right wrist. THUMB NOT INCLUDED     3.   Hold 5-10 seconds as tolerated, perform 10x, 2x/day.

## 2017-07-03 NOTE — PROGRESS NOTES
"OT Daily Progress Note    Patient:  Stacia Clark  [facility] #:  [id number]   Date of Note: 07/03/2017   Referring Physician:  Cristi Tovar Jr., *  Diagnosis: R DeQuervain's; neural tension  1. Right wrist pain     2. Pain of right thumb         Start Time: 9:00 am  End Time: 9:50 am  Total Time: 50 min  Group Time: 20    Visit 8 of 12 authorized      Subjective:   "I did a little too much yesterday and felt some numbness, but it's mostly the same in the wrist. I don't feel as much elbow pain"  Pain: 6 out of 10     Objective:   Patient seen by OT this session. Treatment  consist of the following:  Paraffin, Fluidotherapy, Ultrasound, manual therapy/joint mobilization and Therapeutic exercises    Patient received paraffin bath to R hand for 10 minutes to increase blood flow, circulation, pain management and for tissue elasticity prior to therex. Patient received ultrasound to R 1st dorsal compartment area to increase blood flow, circulation, tissue elasticity, pain management and for wound/scar management for 8 minutes @ 3.3 Mhz, Intensity 0.8 w/cm2 at 100* duty cycle. Pt received cross friction massage to 1st dorsal compartment x 10 minutes to trigger an inflammatory healing response and stimulate the production of new collagen and proper, more functional, less painful healing. Patient received fluidotherapy to R hand for 20 minutes to increase blood flow, circulation, desensitization, sensory re-education and for pain management. Pt was instructed on and performed the following exercises while in fluido: wrist ROM (flex/ext, rd/ud, sup/pro), thumb ROM (radial ABD, palmar ABD) x 3 min each, opposition, and composite fist pumping x 2 min each. Reviewed HEP and modality use for pain management with patient reporting good understanding of same.     Treatment: Paraffin x 10 min, Ultrasound x 8 min, Therapeutic exercises x 20 min and Manual therapy x 10 min    Reassessed 6/30/17     Assessment:  Pt will continue to " benefit from skilled OT intervention. Pt reports compliance with HEP. Pt reports less pain with thumb opposition in fluido. Added wrist extensor and wrist extension stretch to HEP today. Pt still with pain and loss of motion affecting function including ADLs and work tasks. Patient continues to demonstrate limitation  with  Decreased fine motor coordination, Decreased functional use, Continued pain and Continued inflammation all of which interfere with pt's vocational and leisurely pursuits.         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  3)   Increase  strength 10 lbs. to grasp boxes for work.  4)   Increase pinch 1-3 psis for buttons and fasteners.  5)   Decrease complaints of pain to  2 out of 10 to increase functional hand use for ADL/work/leisure activities.  6)   Patient to score at 54% or more on FOTO to demonstrate improved perception of functional hand use.        Plan:  Pt to be treated by Occupational Therapy 2 times per week for 6 weeks during the certification period from 6/14/17 to 7/21/17 to achieve the established goals.

## 2017-07-05 NOTE — PROGRESS NOTES
"OT Daily Progress Note    Patient:  Stacia Clark  [facility] #:  [id number]   Date of Note: 07/05/2017   Referring Physician:  Cristi Tovar Jr., *  Diagnosis: R DeQuervain's; neural tension  1. Right wrist pain     2. Pain of right thumb         Start Time: 8:00 am  End Time: 8:455 am  Total Time: 55 min  Group Time: -    Visit 9 of 12 authorized      Subjective:   "The stretches felt pretty tight and my wrist is a little sore."  Pain: 6 out of 10     Objective:   Patient seen by OT this session. Treatment  consist of the following:  Paraffin, Fluidotherapy, Ultrasound, manual therapy/joint mobilization and Therapeutic exercises    Patient received paraffin bath to R hand for 10 minutes to increase blood flow, circulation, pain management and for tissue elasticity prior to therex. Patient received ultrasound to R 1st dorsal compartment area to increase blood flow, circulation, tissue elasticity, pain management and for wound/scar management for 8 minutes @ 3.3 Mhz, Intensity 0.8 w/cm2 at 100* duty cycle. Pt received cross friction massage to 1st dorsal compartment x 10 minutes to trigger an inflammatory healing response and stimulate the production of new collagen and proper, more functional, less painful healing. Patient received fluidotherapy to R hand for 12 minutes to increase blood flow, circulation, desensitization, sensory re-education and for pain management. Pt was instructed on and performed the following exercises while in fluido: wrist ROM (flex/ext, rd/ud, sup/pro), thumb ROM (radial ABD, palmar ABD), and composite fist pumping x 2 min each. Manual therapy for PROM stretching of R wrist into flexion and extension x 5 min. Reviewed HEP and modality use for pain management with patient reporting good understanding of same.     Treatment: Paraffin x 10 min, Ultrasound x 8 min, Therapeutic exercises x 22 min and Manual therapy x 15 min    Reassessed 6/30/17     Assessment:  Pt will continue to benefit " from skilled OT intervention. Pt reports compliance with added stretches to HEP, but reports that they were difficult. Added manual wrist stretching today with pt tolerating well. Pt still with pain and loss of motion affecting function including ADLs and work tasks. Patient continues to demonstrate limitation  with  Decreased fine motor coordination, Decreased functional use, Continued pain and Continued inflammation all of which interfere with pt's vocational and leisurely pursuits.         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  3)   Increase  strength 10 lbs. to grasp boxes for work.  4)   Increase pinch 1-3 psis for buttons and fasteners.  5)   Decrease complaints of pain to  2 out of 10 to increase functional hand use for ADL/work/leisure activities.  6)   Patient to score at 54% or more on FOTO to demonstrate improved perception of functional hand use.        Plan:  Pt to be treated by Occupational Therapy 2 times per week for 6 weeks during the certification period from 6/14/17 to 7/21/17 to achieve the established goals.

## 2017-07-06 ENCOUNTER — CLINICAL SUPPORT (OUTPATIENT)
Dept: REHABILITATION | Facility: HOSPITAL | Age: 42
End: 2017-07-06
Attending: ORTHOPAEDIC SURGERY
Payer: OTHER MISCELLANEOUS

## 2017-07-06 DIAGNOSIS — M25.531 RIGHT WRIST PAIN: ICD-10-CM

## 2017-07-06 DIAGNOSIS — M79.644 PAIN OF RIGHT THUMB: ICD-10-CM

## 2017-07-06 PROCEDURE — 97140 MANUAL THERAPY 1/> REGIONS: CPT | Mod: PO

## 2017-07-06 PROCEDURE — 97018 PARAFFIN BATH THERAPY: CPT | Mod: PO

## 2017-07-06 PROCEDURE — 97110 THERAPEUTIC EXERCISES: CPT | Mod: PO

## 2017-07-06 PROCEDURE — 97035 APP MDLTY 1+ULTRASOUND EA 15: CPT | Mod: PO

## 2017-07-06 NOTE — PROGRESS NOTES
"OT Daily Progress Note    Patient:  Stacia Clark  [facility] #:  [id number]   Date of Note: 07/06/2017   Referring Physician:  Cristi Tovar Jr., *  Diagnosis: R DeQuervain's; neural tension  1. Right wrist pain     2. Pain of right thumb         Start Time: 8:55 am  End Time: 9:40 am  Total Time: 45 min  Group Time: 20    Visit 10 of 12 authorized      Subjective:   "I wear the splint most of the time, but I take it off if I need to cook or clean or anything where I need to use my hand."  Pain: 6 out of 10     Objective:   Patient seen by OT this session. Treatment  consist of the following:  Paraffin, Fluidotherapy, Ultrasound, manual therapy/joint mobilization and Therapeutic exercises    Patient received paraffin bath to R hand for 10 minutes to increase blood flow, circulation, pain management and for tissue elasticity prior to therex. Patient received ultrasound to R 1st dorsal compartment area to increase blood flow, circulation, tissue elasticity, pain management and for wound/scar management for 8 minutes @ 3.3 Mhz, Intensity 0.8 w/cm2 at 100* duty cycle. Pt received cross friction massage to 1st dorsal compartment x 10 minutes to trigger an inflammatory healing response and stimulate the production of new collagen and proper, more functional, less painful healing. Performed over wedge AROM for wrist flexion/extension (2 positions), RD/UD, supination/pronation 2 x 15 reps each for wrist strengthening and ROM. Pt performed 1# dowel for eccentric strengthening of wrist flexors and extensors x 15 reps each. Pt performed thumb AROM for radial abduction, palmar abduction x 15 reps each. Manual therapy for PROM stretching of R wrist into flexion and extension x 5 min. Reviewed HEP and modality use for pain management with patient reporting good understanding of same.     Treatment: Paraffin x 10 min, Ultrasound x 8 min, Therapeutic exercises x 12 min and Manual therapy x 15 min    Reassessed 6/30/17   " "  Assessment:  Pt will continue to benefit from skilled OT intervention. Pt reports less "knotting" in her volar thumb area. Less inflammation observed in 1st dorsal compartment with massage today. Attempted wrist and thumb exercises outside of fluido today with pt tolerating well. Pt still has significant pain with all wrist and thumb movements. Pt still with pain and loss of motion affecting function including ADLs and work tasks. Patient continues to demonstrate limitation  with  Decreased fine motor coordination, Decreased functional use, Continued pain and Continued inflammation all of which interfere with pt's vocational and leisurely pursuits.         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  3)   Increase  strength 10 lbs. to grasp boxes for work.  4)   Increase pinch 1-3 psis for buttons and fasteners.  5)   Decrease complaints of pain to  2 out of 10 to increase functional hand use for ADL/work/leisure activities.  6)   Patient to score at 54% or more on FOTO to demonstrate improved perception of functional hand use.        Plan:  Pt to be treated by Occupational Therapy 2 times per week for 6 weeks during the certification period from 6/14/17 to 7/21/17 to achieve the established goals.         "

## 2017-07-07 ENCOUNTER — CLINICAL SUPPORT (OUTPATIENT)
Dept: REHABILITATION | Facility: HOSPITAL | Age: 42
End: 2017-07-07
Attending: ORTHOPAEDIC SURGERY
Payer: OTHER MISCELLANEOUS

## 2017-07-07 DIAGNOSIS — M79.644 PAIN OF RIGHT THUMB: ICD-10-CM

## 2017-07-07 DIAGNOSIS — M25.531 RIGHT WRIST PAIN: ICD-10-CM

## 2017-07-07 PROCEDURE — 97140 MANUAL THERAPY 1/> REGIONS: CPT | Mod: PO

## 2017-07-07 PROCEDURE — 97018 PARAFFIN BATH THERAPY: CPT | Mod: PO

## 2017-07-07 PROCEDURE — 97035 APP MDLTY 1+ULTRASOUND EA 15: CPT | Mod: PO

## 2017-07-07 PROCEDURE — 97110 THERAPEUTIC EXERCISES: CPT | Mod: PO

## 2017-07-10 ENCOUNTER — CLINICAL SUPPORT (OUTPATIENT)
Dept: REHABILITATION | Facility: HOSPITAL | Age: 42
End: 2017-07-10
Attending: ORTHOPAEDIC SURGERY
Payer: OTHER MISCELLANEOUS

## 2017-07-10 DIAGNOSIS — M79.644 PAIN OF RIGHT THUMB: ICD-10-CM

## 2017-07-10 DIAGNOSIS — M25.531 RIGHT WRIST PAIN: ICD-10-CM

## 2017-07-10 PROCEDURE — 97140 MANUAL THERAPY 1/> REGIONS: CPT | Mod: PO

## 2017-07-10 PROCEDURE — 97110 THERAPEUTIC EXERCISES: CPT | Mod: PO

## 2017-07-10 PROCEDURE — 97018 PARAFFIN BATH THERAPY: CPT | Mod: PO

## 2017-07-10 NOTE — PROGRESS NOTES
Patient provided written and verbal consent to receive functional dry needling at today's visit (see consent form scanned into chart). FDN performed to L 1st dorsal interossei and adductor hallicus followed by the thenar eminence. FDN performed to reduce pain and muscle tension, promote blood flow, and improve ROM and function x 3 minutes. Pt tolerated tx well without adverse effects. She was educated on what to expect following the procedure and she verbalized understanding.

## 2017-07-10 NOTE — PROGRESS NOTES
"OT Daily Progress Note    Patient:  Stacia Clark  [facility] #:  [id number]   Date of Note: 07/10/2017   Referring Physician:  Cristi Tovar Jr., *  Diagnosis: R DeQuervain's; neural tension  1. Right wrist pain     2. Pain of right thumb         Start Time: 1  End Time: 2  Total Time: 60 min  Group Time: 20    Visit 11 of 12 authorized      Subjective:   "I wear the splint most of the time, but I take it off if I need to cook or clean or anything where I need to use my hand."  Pain: 6 out of 10     Objective:   Patient seen by OT this session. Treatment  consist of the following:  Paraffin, Fluidotherapy, Ultrasound, manual therapy/joint mobilization and Therapeutic exercises    Patient received paraffin bath to R hand for 10 minutes to increase blood flow, circulation, pain management and for tissue elasticity prior to therex. Patient received ultrasound to R 1st dorsal compartment area to increase blood flow, circulation, tissue elasticity, pain management and for wound/scar management for 8 minutes @ 3.3 Mhz, Intensity 0.8 w/cm2 at 100* duty cycle. Patient received fluidotherapy to R hand for 12 minutes to increase blood flow, circulation, desensitization, sensory re-education and for pain management. Pt was instructed on and performed the following exercises while in fluido: wrist ROM (flex/ext, rd/ud, sup/pro), thumb ROM (radial ABD, palmar ABD), and composite fist pumping x 2 min each. Pt received cross friction massage to 1st dorsal compartment x 10 minutes to trigger an inflammatory healing response and stimulate the production of new collagen and proper, more functional, less painful healing. Patient performed 15# PHG for sustained gripping,moving large pegs from pegboard to basket without compensation via supination x 3 trials, for  strengthening. Reviewed HEP and modality use for pain management with patient reporting good understanding of same. Dry needling performed by Lilliana (see attached PT " "note).     Treatment: Paraffin x 10 min, Ultrasound x 8 min, Therapeutic exercises x 15 min and Manual therapy x 10 min, Dry needling x 10 min    Reassessed 6/30/17     Assessment:  Pt will continue to benefit from skilled OT intervention. Pt reports less "knotting" in her volar thumb area. Less inflammation observed in 1st dorsal compartment with massage today. Pt still has significant pain with all wrist and thumb movements. Pt still with pain and loss of motion affecting function including ADLs and work tasks. Pt participated in dry needling today to relieve wrist/thumb pain. Information obtained and sent to worker's compensation  in order to schedule FCE. Patient continues to demonstrate limitation  with  Decreased fine motor coordination, Decreased functional use, Continued pain and Continued inflammation all of which interfere with pt's vocational and leisurely pursuits.         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  3)   Increase  strength 10 lbs. to grasp boxes for work.  4)   Increase pinch 1-3 psis for buttons and fasteners.  5)   Decrease complaints of pain to  2 out of 10 to increase functional hand use for ADL/work/leisure activities.  6)   Patient to score at 54% or more on FOTO to demonstrate improved perception of functional hand use.        Plan:  Pt to be treated by Occupational Therapy 2 times per week for 6 weeks during the certification period from 6/14/17 to 7/21/17 to achieve the established goals.       "

## 2017-07-11 NOTE — PROGRESS NOTES
"OT Daily Progress Note    Patient:  Stacia Clark  [facility] #:  [id number]   Date of Note: 07/11/2017   Referring Physician:  Cristi Tovar Jr., *  Diagnosis: R DeQuervain's; neural tension  1. Right wrist pain     2. Pain of right thumb         Start Time: 1  End Time: 2  Total Time: 60 min  Group Time: 20    Visit 12 of 12 authorized      Subjective:   "Both sides of my wrist have been hurting. I have pain going up into my hand when I bend the wrist."  Pain: 6 out of 10     Objective:   Patient seen by OT this session. Treatment  consist of the following:  Paraffin, Fluidotherapy, Ultrasound, manual therapy/joint mobilization and Therapeutic exercises    Reassessment as follows:     Range of Motion:      Wrist ROM: right - visible shaking in wrists with flexion/extension  Flexion 85 (+20)    Extension 50 (some pain) (=)   Radial Deviation 20 (-15)   Ulnar Deviation 50  (=)   Supination 75 (=)   Pronation 75 (-10)       Strength: (RODNEY Dynamometer in lbs.) Average 3 trials, Position II  Right: 38# (pain) (=)  Left: 50#     Pinch Strength: (Pinch Gauge in psi's), Average 3 trials  Cornejo Pinch                  R) 7 psi's (+2)                  L) 8 psi's  3pt Pinch                   R) 6 psi's  (+1)                 L) 8 psi's  2pt Pinch                   R) 5 psi's  (+1)                 L) 4 psi's    OUTCOME MEASURE: FOTO     Category: Self Care      Current Score  = 49% or 51% impaired (+9)  Goal at Discharge Score = 54% or 46% impaired    Patient received paraffin bath to R hand for 10 minutes to increase blood flow, circulation, pain management and for tissue elasticity prior to therex. Patient received ultrasound to R 1st dorsal compartment area to increase blood flow, circulation, tissue elasticity, pain management and for wound/scar management for 8 minutes @ 3.3 Mhz, Intensity 0.8 w/cm2 at 100* duty cycle. Patient received fluidotherapy to R hand for 12 minutes to increase blood flow, circulation, " desensitization, sensory re-education and for pain management. Pt was instructed on and performed the following exercises while in fluido: wrist ROM (flex/ext, rd/ud, sup/pro), thumb ROM (radial ABD, palmar ABD), and composite fist pumping x 2 min each. Pt received cross friction massage to 1st dorsal compartment x 10 minutes to trigger an inflammatory healing response and stimulate the production of new collagen and proper, more functional, less painful healing. Reviewed HEP and modality use for pain management with patient reporting good understanding of same. Dry needling performed by Lilliana (see attached PT note).     Treatment: Paraffin x 10 min, Ultrasound x 8 min, Therapeutic exercises x 20 min and Manual therapy x 10 min, Dry needling x 10 min    Assessment:  Upon reassessment, pt shows improvement in R wrist flexion and R pinch strength. Wrist measurements have been fluctuating with each reassessment. She has met 3/6 goals for therapy. Information obtained and sent to worker's compensation  in order to schedule FCE. Patient continues to demonstrate limitation  with  Decreased fine motor coordination, Decreased functional use, Continued pain and Continued inflammation all of which interfere with pt's vocational and leisurely pursuits. Pt to be discharged due to having completed all authorized visits for current referral.        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 to grasp boxes for work.-----met  4)   Increase pinch 1-3 psis for buttons and fasteners.-----met  5)   Decrease complaints of pain to  2 out of 10 to increase functional hand use for ADL/work/leisure activities.-----ongoing  6)   Patient to score at 54% or more on FOTO to demonstrate  improved perception of functional hand use.-----progressing        Plan:  Pt to follow up with OT pending new MD referral.

## 2017-07-12 ENCOUNTER — CLINICAL SUPPORT (OUTPATIENT)
Dept: REHABILITATION | Facility: HOSPITAL | Age: 42
End: 2017-07-12
Attending: ORTHOPAEDIC SURGERY
Payer: OTHER MISCELLANEOUS

## 2017-07-12 DIAGNOSIS — M79.644 PAIN OF RIGHT THUMB: ICD-10-CM

## 2017-07-12 DIAGNOSIS — M25.531 RIGHT WRIST PAIN: ICD-10-CM

## 2017-07-12 PROCEDURE — 97018 PARAFFIN BATH THERAPY: CPT | Mod: PO

## 2017-07-12 PROCEDURE — 97140 MANUAL THERAPY 1/> REGIONS: CPT | Mod: PO

## 2017-07-12 PROCEDURE — 97150 GROUP THERAPEUTIC PROCEDURES: CPT | Mod: PO

## 2017-07-18 ENCOUNTER — DOCUMENTATION ONLY (OUTPATIENT)
Dept: REHABILITATION | Facility: HOSPITAL | Age: 42
End: 2017-07-18

## 2017-07-18 DIAGNOSIS — M79.644 PAIN OF RIGHT THUMB: ICD-10-CM

## 2017-07-18 DIAGNOSIS — M25.531 RIGHT WRIST PAIN: ICD-10-CM

## 2017-07-18 NOTE — PROGRESS NOTES
Occupational Therapy Discharge Summary    Patient: Stacia Clark  MRN: [id number]  Diagnosis:   1. Right wrist pain     2. Pain of right thumb           Patient is a referred to Occupational Therapy by with a diagnosis of right wrist and thumb pain and a referral for Eval and Treat.  Patient received initial evaluation on  6/14/17 and returned for 12  treatment sessions. Patient had 0 missed visits.     Patient's treatment included:  - Therapeutic exercise/activities  - Modalities for pain management  - ROM and strengthening      Patient's therapy goals  were met. Pt was not formally reassessed , but was working toward established goals.    Patient is D/C from outpatient occupational therapy secondary to seeking second opinion from different MD.

## 2017-07-26 ENCOUNTER — OFFICE VISIT (OUTPATIENT)
Dept: ORTHOPEDICS | Facility: CLINIC | Age: 42
End: 2017-07-26
Attending: ORTHOPAEDIC SURGERY
Payer: OTHER MISCELLANEOUS

## 2017-07-26 VITALS — WEIGHT: 143 LBS | BODY MASS INDEX: 24.41 KG/M2 | HEIGHT: 64 IN

## 2017-07-26 DIAGNOSIS — M79.601 PAIN OF RIGHT UPPER EXTREMITY: Primary | ICD-10-CM

## 2017-07-26 PROCEDURE — 99213 OFFICE O/P EST LOW 20 MIN: CPT | Mod: S$GLB,,, | Performed by: ORTHOPAEDIC SURGERY

## 2017-07-26 PROCEDURE — 99999 PR PBB SHADOW E&M-EST. PATIENT-LVL II: CPT | Mod: PBBFAC,,, | Performed by: ORTHOPAEDIC SURGERY

## 2017-07-26 PROCEDURE — 99212 OFFICE O/P EST SF 10 MIN: CPT | Mod: PBBFAC | Performed by: ORTHOPAEDIC SURGERY

## 2017-07-26 RX ORDER — MELOXICAM 15 MG/1
15 TABLET ORAL DAILY
Qty: 30 TABLET | Refills: 4 | Status: SHIPPED | OUTPATIENT
Start: 2017-07-26 | End: 2017-08-25

## 2017-07-26 NOTE — PROGRESS NOTES
HISTORY OF PRESENT ILLNESS:  Ms. Clark in followup of chronic pain, right arm   and tendinitis has finished her last round of therapy.  She reports some slight   improvement, which is mostly temporary.  Things seem to come and go with the   right arm.  She is unable to do any heavy lifting.  She did try some dry   needling, which helped out maybe for a week.  No numbness or tingling is   reported.  Previously I ordered an FCE but that was on hold until she got a   second opinion.  She did have a second opinion, but does not have the results of   that.    PHYSICAL EXAMINATION:  RIGHT ARM:  There is no swelling, mild diffuse tenderness about the elbow, wrist   dorsally and at the base of the right thumb.  Range of motion, elbow, wrist and   fingers all full.   strength slightly decreased.  Sensation intact.  Skin   color and temperature normal.  No instability of the wrist or thumb.  Sensation   intact in all digits.    IMPRESSION:  Chronic pain, tendinitis, right arm, forearm and wrist.    PLAN:  I reordered the FCE today, remain on medium duty work, follow up after   the FCE, continue anti-inflammatory medication, Mobic refilled today.      NIMA  dd: 07/26/2017 13:31:38 (CDT)  td: 07/27/2017 00:48:09 (CDT)  Doc ID   #[id number]  Job ID #[id number]    CC:

## 2017-07-27 ENCOUNTER — TELEPHONE (OUTPATIENT)
Dept: ORTHOPEDICS | Facility: CLINIC | Age: 42
End: 2017-07-27

## 2017-07-27 NOTE — TELEPHONE ENCOUNTER
----- Message from Abby Castillo sent at 7/27/2017  8:45 AM CDT -----  Contact: Elidia Calling from [facility] [phone]  Calling to talk to nurse concerning patients referral for therapy.

## 2017-08-03 ENCOUNTER — TELEPHONE (OUTPATIENT)
Dept: ORTHOPEDICS | Facility: CLINIC | Age: 42
End: 2017-08-03

## 2017-08-03 NOTE — TELEPHONE ENCOUNTER
----- Message from Lilliana Duran sent at 8/3/2017  1:15 PM CDT -----  Contact: CURTIS MOLINA [[id number]]  x_  1st Request  _  2nd Request  _  3rd Request        Who: CURTIS MOLINA [[id number]]    Why: Patient states she has scheduled the FCE test for 08/17/17.     What Number to Call Back: [phone]    When to Expect a call back: (With in 24 hours)

## 2017-08-28 ENCOUNTER — TELEPHONE (OUTPATIENT)
Dept: ORTHOPEDICS | Facility: CLINIC | Age: 42
End: 2017-08-28

## 2017-08-28 NOTE — TELEPHONE ENCOUNTER
----- Message from Evangelina Lambert sent at 8/28/2017  1:27 PM CDT -----  Contact: [phone]  Patient would like to know if her test results are in for her appt, on 08-29-17

## 2017-08-28 NOTE — TELEPHONE ENCOUNTER
I spoke with the patient and informed her that we did not get the FCE results. I called [facility] to have them fax it over and they stated they would.

## 2017-08-29 ENCOUNTER — OFFICE VISIT (OUTPATIENT)
Dept: ORTHOPEDICS | Facility: CLINIC | Age: 42
End: 2017-08-29
Payer: OTHER MISCELLANEOUS

## 2017-08-29 ENCOUNTER — TELEPHONE (OUTPATIENT)
Dept: ORTHOPEDICS | Facility: CLINIC | Age: 42
End: 2017-08-29

## 2017-08-29 VITALS — HEIGHT: 64 IN | BODY MASS INDEX: 24.41 KG/M2 | WEIGHT: 143 LBS

## 2017-08-29 DIAGNOSIS — M79.601 PAIN OF RIGHT UPPER EXTREMITY: Primary | ICD-10-CM

## 2017-08-29 PROCEDURE — 99999 PR PBB SHADOW E&M-EST. PATIENT-LVL II: CPT | Mod: PBBFAC,,, | Performed by: ORTHOPAEDIC SURGERY

## 2017-08-29 PROCEDURE — 3008F BODY MASS INDEX DOCD: CPT | Mod: S$GLB,,, | Performed by: ORTHOPAEDIC SURGERY

## 2017-08-29 PROCEDURE — 99213 OFFICE O/P EST LOW 20 MIN: CPT | Mod: S$GLB,,, | Performed by: ORTHOPAEDIC SURGERY

## 2017-08-29 RX ORDER — MELOXICAM 15 MG/1
15 TABLET ORAL DAILY
Qty: 30 TABLET | Refills: 3 | Status: SHIPPED | OUTPATIENT
Start: 2017-08-29 | End: 2017-09-28

## 2017-08-29 NOTE — PROGRESS NOTES
INITIVAL VISIT HISTORY:  Ms. Clark in followup of chronic pain, right arm.    Recently, he had an FCE to evaluate her ability to use the arm.  The results of   the FCE were valid and detailed report was reviewed with the patient.  I also   gave her a copy of it today.  The conclusion is that she should be able to do   light to medium duty work on a repetitive basis.  The details are included but   it appears that she is in the 20-30 pound occasional lifting and 10-20 pound   repetitive lifting or frequent and I went over that with her today.    In terms of her symptoms, she remains about the same in terms of pain right arm,   which seems to be centered in the forearm, hand and thumb areas, no numbness or   tingling is reported.  The Mobic seems to help.  She takes that on a regular   basis.  She also used the medicated cream.    PHYSICAL EXAMINATION:  Today, there are no specific areas of swelling or   tenderness.  Range of motion of elbow, wrist and fingers full.   strength is   decreased.  Sensation is intact.  No atrophy noted in right hand or wrist.    PLAN:  She has reached maximum medical improvement regarding her right arm   symptoms, which appear to be sort of a chronic pain syndrome related to   tendinitis and overuse.  I think she can be released at the light to medium duty   category within the FCE restrictions.  She does have a slight impairment in the   right arm.  I would rate this as 5% in the right upper extremity based on her   symptoms.  Mobic refilled.  Follow up as needed only.    ADDENDUM:  If in the future she wants to be evaluated by the Pain Clinic, we   discussed this in the past, I would be happy to make a referral to the Pain   Clinic if she chooses.      NIMA  dd: 08/29/2017 11:01:43 (CDT)  td: 08/30/2017 01:03:59 (CDT)  Doc ID   #[id number]  Job ID #[id number]    CC:

## 2017-08-29 NOTE — TELEPHONE ENCOUNTER
----- Message from Bhakti Pratt sent at 8/29/2017  9:13 AM CDT -----  Contact: self  Pt is returning call from this office. She can be reached at [phone]

## 2017-08-29 NOTE — TELEPHONE ENCOUNTER
----- Message from Elizabeth Reagan sent at 8/29/2017  8:25 AM CDT -----  Contact: Pt  Pt is calling to speak with nurse in regards to blood work results and can be reached at [phone]. Please call before appt today.    Thank you

## 2018-05-31 DIAGNOSIS — Z12.39 BREAST CANCER SCREENING: ICD-10-CM

## 2023-12-31 NOTE — PROGRESS NOTES
"OT Daily Progress Note    Patient:  Stacia Clark  [facility] #:  [id number]   Date of Note: 06/29/2017   Referring Physician:  Cristi Tovar Jr., *  Diagnosis: R DeQuervain's; neural tension  1. Right wrist pain     2. Pain of right thumb         Start Time: 9  End Time: 10  Total Time: 60 min  Group Time: 20    Visit 7 of 12 authorized      Subjective:   "I did a little too much yesterday and felt some numbness, but it's mostly the same in the wrist. I don't feel as much elbow pain"  Pain: 6 out of 10     Objective:   Patient seen by OT this session. Treatment  consist of the following:  Paraffin, Fluidotherapy, Ultrasound, manual therapy/joint mobilization and Therapeutic exercises    Reassessment as follows:    Special Tests:   Finkelstein's Test - positive  Grind test - mild positive  Scaphoid shift test - mild positive  Speed's test - positive  Median and radial neural tension indicated by screening from PT     Range of Motion:      Wrist ROM: right  Flexion 95   Extension 50 (some pain)   Radial Deviation 35   Ulnar Deviation 50   Supination 55 (some pain)   Pronation 85       Strength: (RODNEY Dynamometer in lbs.) Average 3 trials, Position II  Right: 28# (pain)  Left: 50#     Pinch Strength: (Pinch Gauge in psi's), Average 3 trials  Cornejo Pinch                  R) 5 psi's                   L) 8 psi's  3pt Pinch                   R) 5 psi's                   L) 8 psi's  2pt Pinch                   R) 4 psi's                   L) 4 psi's        OUTCOME MEASURE: FOTO     Category: Self Care      Current Score  = 40% or 60% impaired  Goal at Discharge Score = 54% or 46% impaired    Score interpretation is as follows:  CL = least 60% but < 80% impaired, limited or restricted      Patient received paraffin bath to R hand for 10 minutes to increase blood flow, circulation, pain management and for tissue elasticity prior to therex. Patient received ultrasound to R 1st dorsal compartment area to increase blood " flow, circulation, tissue elasticity, pain management and for wound/scar management for 8 minutes @ 3.3 Mhz, Intensity 0.8 w/cm2 at 50* duty cycle. Pt received cross friction massage to 1st dorsal compartment x 8 minutes to trigger an inflammatory healing response and stimulate the production of new collagen and proper, more functional, less painful healing. Patient received fluidotherapy to R hand for 12 minutes to increase blood flow, circulation, desensitization, sensory re-education and for pain management. Pt was instructed on and performed the following exercises while in fluido: wrist ROM (flex/ext, rd/ud, sup/pro), thumb ROM (radial ABD, palmar ABD), and composite fist pumping x 2 min each. Patient received manual brachial nerve glides for R UE x 5 min for stretching and flossing to decrease pain and impingement. Pt performed scapular retraction and shoulder extension x 15 reps with yellow tband to strengthen scapulas and open up chest area to relieve neural compression. Kinesiotaped to reposition humeral head and open chest area. Reviewed HEP and modality use for pain management with patient reporting good understanding of same.     Treatment: Paraffin x 10 min, Ultrasound x 8 min, Therapeutic exercises x 20 min and Manual therapy x 15 min     Assessment:  Pt will continue to benefit from skilled OT intervention. Pt reports compliance with HEP. Upon reassessment, pt . Patient continues to demonstrate limitation  with  Decreased fine motor coordination, Decreased functional use, Continued pain and Continued inflammation all of which interfere with pt's vocational and leisurely pursuits.         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  3)   Increase  strength 10 lbs. to grasp boxes for work.  4)   Increase pinch 1-3  "psis for buttons and fasteners.  5)   Decrease complaints of pain to  2 out of 10 to increase functional hand use for ADL/work/leisure activities.  6)   Patient to score at 54% or more on FOTO to demonstrate improved perception of functional hand use.        Plan:  Pt to be treated by Occupational Therapy 2 times per week for 6 weeks during the certification period from 6/14/17 to 7/21/17 to achieve the established goals.         Student: LYNDA Longo    " I certify that I was present in the room directing the student in service delivery and guiding them using my skilled judgement. As the co-signing therapist, I have reviewed the student's documentation and am responsible for the treatment, assessment and plan."    Therapist:    " dental pain/injury